# Patient Record
Sex: FEMALE | Race: WHITE | NOT HISPANIC OR LATINO | Employment: OTHER | URBAN - METROPOLITAN AREA
[De-identification: names, ages, dates, MRNs, and addresses within clinical notes are randomized per-mention and may not be internally consistent; named-entity substitution may affect disease eponyms.]

---

## 2017-11-20 ENCOUNTER — TELEPHONE (OUTPATIENT)
Dept: GASTROENTEROLOGY | Facility: CLINIC | Age: 77
End: 2017-11-20

## 2017-11-20 DIAGNOSIS — K83.8 DILATED BILE DUCT: Primary | ICD-10-CM

## 2017-11-21 NOTE — TELEPHONE ENCOUNTER
Spoke with patient's . She is seeing her Cardiologist on 12/1. Wants to wait until after that visit before deciding on when to schedule.

## 2017-12-06 ENCOUNTER — TELEPHONE (OUTPATIENT)
Dept: GASTROENTEROLOGY | Facility: CLINIC | Age: 77
End: 2017-12-06

## 2017-12-06 NOTE — TELEPHONE ENCOUNTER
Spoke with patient's daughter Dara Sandy [phone].   EUS/ERCP scheduled for 12/18 at   Reviewed prep instructions. Dara verbalized understanding.     Cardiology clearance for procedure is scanned under Media

## 2017-12-07 ENCOUNTER — HOSPITAL ENCOUNTER (INPATIENT)
Facility: HOSPITAL | Age: 77
LOS: 4 days | Discharge: HOME | DRG: 444 | End: 2017-12-11
Attending: INTERNAL MEDICINE | Admitting: INTERNAL MEDICINE
Payer: MEDICARE

## 2017-12-07 DIAGNOSIS — R74.01 TRANSAMINITIS: Primary | ICD-10-CM

## 2017-12-07 DIAGNOSIS — R50.9 FEVER, UNSPECIFIED FEVER CAUSE: ICD-10-CM

## 2017-12-07 DIAGNOSIS — R50.9 FEVER: ICD-10-CM

## 2017-12-07 DIAGNOSIS — R00.1 BRADYCARDIA: ICD-10-CM

## 2017-12-07 PROCEDURE — 20600001 HC STEP DOWN PRIVATE ROOM

## 2017-12-08 ENCOUNTER — ANESTHESIA EVENT (OUTPATIENT)
Dept: ENDOSCOPY | Facility: HOSPITAL | Age: 77
DRG: 444 | End: 2017-12-08
Payer: MEDICARE

## 2017-12-08 ENCOUNTER — ANESTHESIA (OUTPATIENT)
Dept: ENDOSCOPY | Facility: HOSPITAL | Age: 77
DRG: 444 | End: 2017-12-08
Payer: MEDICARE

## 2017-12-08 PROBLEM — R74.01 TRANSAMINITIS: Status: ACTIVE | Noted: 2017-12-08

## 2017-12-08 PROBLEM — F01.50 VASCULAR DEMENTIA: Status: ACTIVE | Noted: 2017-12-08

## 2017-12-08 PROBLEM — I10 HTN (HYPERTENSION): Status: ACTIVE | Noted: 2017-12-08

## 2017-12-08 PROBLEM — E11.9 DM (DIABETES MELLITUS): Status: ACTIVE | Noted: 2017-12-08

## 2017-12-08 LAB
ALBUMIN SERPL BCP-MCNC: 3.5 G/DL
ALP SERPL-CCNC: 153 U/L
ALT SERPL W/O P-5'-P-CCNC: 230 U/L
ANION GAP SERPL CALC-SCNC: 10 MMOL/L
AST SERPL-CCNC: 127 U/L
BASOPHILS # BLD AUTO: 0.04 K/UL
BASOPHILS NFR BLD: 0.5 %
BILIRUB DIRECT SERPL-MCNC: 0.4 MG/DL
BILIRUB SERPL-MCNC: 0.8 MG/DL
BILIRUB UR QL STRIP: NEGATIVE
BUN SERPL-MCNC: 11 MG/DL
CALCIUM SERPL-MCNC: 9.5 MG/DL
CHLORIDE SERPL-SCNC: 99 MMOL/L
CLARITY UR REFRACT.AUTO: CLEAR
CO2 SERPL-SCNC: 31 MMOL/L
COLOR UR AUTO: ABNORMAL
CREAT SERPL-MCNC: 0.9 MG/DL
DIFFERENTIAL METHOD: ABNORMAL
EOSINOPHIL # BLD AUTO: 0.1 K/UL
EOSINOPHIL NFR BLD: 1.1 %
ERYTHROCYTE [DISTWIDTH] IN BLOOD BY AUTOMATED COUNT: 13.7 %
EST. GFR  (AFRICAN AMERICAN): >60 ML/MIN/1.73 M^2
EST. GFR  (NON AFRICAN AMERICAN): >60 ML/MIN/1.73 M^2
ESTIMATED AVG GLUCOSE: 134 MG/DL
GLUCOSE SERPL-MCNC: 138 MG/DL
GLUCOSE UR QL STRIP: ABNORMAL
HAV IGM SERPL QL IA: NEGATIVE
HBA1C MFR BLD HPLC: 6.3 %
HBV CORE IGM SERPL QL IA: NEGATIVE
HBV SURFACE AG SERPL QL IA: NEGATIVE
HCT VFR BLD AUTO: 38.2 %
HCV AB SERPL QL IA: NEGATIVE
HGB BLD-MCNC: 12.6 G/DL
HGB UR QL STRIP: NEGATIVE
IMM GRANULOCYTES # BLD AUTO: 0.04 K/UL
IMM GRANULOCYTES NFR BLD AUTO: 0.5 %
INR PPP: 1.1
KETONES UR QL STRIP: ABNORMAL
LACTATE SERPL-SCNC: 0.8 MMOL/L
LACTATE SERPL-SCNC: 2.4 MMOL/L
LEUKOCYTE ESTERASE UR QL STRIP: NEGATIVE
LYMPHOCYTES # BLD AUTO: 0.7 K/UL
LYMPHOCYTES NFR BLD: 8.2 %
MAGNESIUM SERPL-MCNC: 2.2 MG/DL
MCH RBC QN AUTO: 30 PG
MCHC RBC AUTO-ENTMCNC: 33 G/DL
MCV RBC AUTO: 91 FL
MONOCYTES # BLD AUTO: 0.8 K/UL
MONOCYTES NFR BLD: 10.2 %
NEUTROPHILS # BLD AUTO: 6.6 K/UL
NEUTROPHILS NFR BLD: 79.5 %
NITRITE UR QL STRIP: NEGATIVE
NRBC BLD-RTO: 0 /100 WBC
PH UR STRIP: 8 [PH] (ref 5–8)
PHOSPHATE SERPL-MCNC: 2.6 MG/DL
PLATELET # BLD AUTO: 129 K/UL
PMV BLD AUTO: 11.4 FL
POCT GLUCOSE: 161 MG/DL (ref 70–110)
POCT GLUCOSE: 164 MG/DL (ref 70–110)
POCT GLUCOSE: 171 MG/DL (ref 70–110)
POCT GLUCOSE: 210 MG/DL (ref 70–110)
POCT GLUCOSE: 217 MG/DL (ref 70–110)
POTASSIUM SERPL-SCNC: 3.8 MMOL/L
PROCALCITONIN SERPL IA-MCNC: 0.39 NG/ML
PROT SERPL-MCNC: 8 G/DL
PROT UR QL STRIP: NEGATIVE
PROTHROMBIN TIME: 11.2 SEC
RBC # BLD AUTO: 4.2 M/UL
SODIUM SERPL-SCNC: 140 MMOL/L
SP GR UR STRIP: 1 (ref 1–1.03)
URN SPEC COLLECT METH UR: ABNORMAL
UROBILINOGEN UR STRIP-ACNC: NEGATIVE EU/DL
WBC # BLD AUTO: 8.25 K/UL

## 2017-12-08 PROCEDURE — C1769 GUIDE WIRE: HCPCS | Performed by: INTERNAL MEDICINE

## 2017-12-08 PROCEDURE — 83036 HEMOGLOBIN GLYCOSYLATED A1C: CPT

## 2017-12-08 PROCEDURE — 84145 PROCALCITONIN (PCT): CPT

## 2017-12-08 PROCEDURE — 94761 N-INVAS EAR/PLS OXIMETRY MLT: CPT

## 2017-12-08 PROCEDURE — 25000003 PHARM REV CODE 250: Performed by: NURSE ANESTHETIST, CERTIFIED REGISTERED

## 2017-12-08 PROCEDURE — 25000003 PHARM REV CODE 250: Performed by: STUDENT IN AN ORGANIZED HEALTH CARE EDUCATION/TRAINING PROGRAM

## 2017-12-08 PROCEDURE — 43274 ERCP DUCT STENT PLACEMENT: CPT | Performed by: INTERNAL MEDICINE

## 2017-12-08 PROCEDURE — 82962 GLUCOSE BLOOD TEST: CPT | Performed by: INTERNAL MEDICINE

## 2017-12-08 PROCEDURE — 36415 COLL VENOUS BLD VENIPUNCTURE: CPT

## 2017-12-08 PROCEDURE — 27202125 HC BALLOON, EXTRACTION (ANY): Performed by: INTERNAL MEDICINE

## 2017-12-08 PROCEDURE — 63600175 PHARM REV CODE 636 W HCPCS: Performed by: STUDENT IN AN ORGANIZED HEALTH CARE EDUCATION/TRAINING PROGRAM

## 2017-12-08 PROCEDURE — C2617 STENT, NON-COR, TEM W/O DEL: HCPCS | Performed by: INTERNAL MEDICINE

## 2017-12-08 PROCEDURE — 81003 URINALYSIS AUTO W/O SCOPE: CPT

## 2017-12-08 PROCEDURE — 74330 X-RAY BILE/PANC ENDOSCOPY: CPT | Mod: 26,,, | Performed by: INTERNAL MEDICINE

## 2017-12-08 PROCEDURE — 43274 ERCP DUCT STENT PLACEMENT: CPT | Mod: 59,,, | Performed by: INTERNAL MEDICINE

## 2017-12-08 PROCEDURE — 83605 ASSAY OF LACTIC ACID: CPT

## 2017-12-08 PROCEDURE — 87040 BLOOD CULTURE FOR BACTERIA: CPT

## 2017-12-08 PROCEDURE — 63600175 PHARM REV CODE 636 W HCPCS

## 2017-12-08 PROCEDURE — D9220A PRA ANESTHESIA: Mod: ANES,,, | Performed by: ANESTHESIOLOGY

## 2017-12-08 PROCEDURE — 43259 EGD US EXAM DUODENUM/JEJUNUM: CPT | Performed by: INTERNAL MEDICINE

## 2017-12-08 PROCEDURE — 85610 PROTHROMBIN TIME: CPT

## 2017-12-08 PROCEDURE — 43259 EGD US EXAM DUODENUM/JEJUNUM: CPT | Mod: 51,,, | Performed by: INTERNAL MEDICINE

## 2017-12-08 PROCEDURE — D9220A PRA ANESTHESIA: Mod: CRNA,,, | Performed by: NURSE ANESTHETIST, CERTIFIED REGISTERED

## 2017-12-08 PROCEDURE — 99223 1ST HOSP IP/OBS HIGH 75: CPT | Mod: 25,GC,, | Performed by: INTERNAL MEDICINE

## 2017-12-08 PROCEDURE — 80074 ACUTE HEPATITIS PANEL: CPT

## 2017-12-08 PROCEDURE — 99223 1ST HOSP IP/OBS HIGH 75: CPT | Mod: AI,GC,, | Performed by: INTERNAL MEDICINE

## 2017-12-08 PROCEDURE — 83735 ASSAY OF MAGNESIUM: CPT

## 2017-12-08 PROCEDURE — 37000009 HC ANESTHESIA EA ADD 15 MINS: Performed by: INTERNAL MEDICINE

## 2017-12-08 PROCEDURE — 83605 ASSAY OF LACTIC ACID: CPT | Mod: 91

## 2017-12-08 PROCEDURE — 63600175 PHARM REV CODE 636 W HCPCS: Performed by: NURSE ANESTHETIST, CERTIFIED REGISTERED

## 2017-12-08 PROCEDURE — 99499 UNLISTED E&M SERVICE: CPT | Mod: ,,, | Performed by: PHYSICIAN ASSISTANT

## 2017-12-08 PROCEDURE — 85025 COMPLETE CBC W/AUTO DIFF WBC: CPT

## 2017-12-08 PROCEDURE — 27202127 HC STENT INTRODUCER: Performed by: INTERNAL MEDICINE

## 2017-12-08 PROCEDURE — 80048 BASIC METABOLIC PNL TOTAL CA: CPT

## 2017-12-08 PROCEDURE — 80076 HEPATIC FUNCTION PANEL: CPT

## 2017-12-08 PROCEDURE — 0F798DZ DILATION OF COMMON BILE DUCT WITH INTRALUMINAL DEVICE, VIA NATURAL OR ARTIFICIAL OPENING ENDOSCOPIC: ICD-10-PCS | Performed by: INTERNAL MEDICINE

## 2017-12-08 PROCEDURE — 0DJ08ZZ INSPECTION OF UPPER INTESTINAL TRACT, VIA NATURAL OR ARTIFICIAL OPENING ENDOSCOPIC: ICD-10-PCS | Performed by: INTERNAL MEDICINE

## 2017-12-08 PROCEDURE — 20600001 HC STEP DOWN PRIVATE ROOM

## 2017-12-08 PROCEDURE — 27000221 HC OXYGEN, UP TO 24 HOURS

## 2017-12-08 PROCEDURE — 0F7D8DZ DILATION OF PANCREATIC DUCT WITH INTRALUMINAL DEVICE, VIA NATURAL OR ARTIFICIAL OPENING ENDOSCOPIC: ICD-10-PCS | Performed by: INTERNAL MEDICINE

## 2017-12-08 PROCEDURE — 27201674 HC SPHINCTERTOME: Performed by: INTERNAL MEDICINE

## 2017-12-08 PROCEDURE — 74330 X-RAY BILE/PANC ENDOSCOPY: CPT | Performed by: INTERNAL MEDICINE

## 2017-12-08 PROCEDURE — 37000008 HC ANESTHESIA 1ST 15 MINUTES: Performed by: INTERNAL MEDICINE

## 2017-12-08 PROCEDURE — 25000003 PHARM REV CODE 250: Performed by: INTERNAL MEDICINE

## 2017-12-08 PROCEDURE — 84100 ASSAY OF PHOSPHORUS: CPT

## 2017-12-08 DEVICE — IMPLANTABLE DEVICE
Type: IMPLANTABLE DEVICE | Site: PANCREAS | Status: NON-FUNCTIONAL
Removed: 2018-01-04

## 2017-12-08 RX ORDER — ENOXAPARIN SODIUM 100 MG/ML
40 INJECTION SUBCUTANEOUS EVERY 24 HOURS
Status: DISCONTINUED | OUTPATIENT
Start: 2017-12-08 | End: 2017-12-08

## 2017-12-08 RX ORDER — IBUPROFEN 200 MG
24 TABLET ORAL
Status: DISCONTINUED | OUTPATIENT
Start: 2017-12-08 | End: 2017-12-11 | Stop reason: HOSPADM

## 2017-12-08 RX ORDER — PHENYLEPHRINE HYDROCHLORIDE 10 MG/ML
INJECTION INTRAVENOUS
Status: DISCONTINUED | OUTPATIENT
Start: 2017-12-08 | End: 2017-12-08

## 2017-12-08 RX ORDER — METRONIDAZOLE 500 MG/1
500 TABLET ORAL EVERY 8 HOURS
Status: DISCONTINUED | OUTPATIENT
Start: 2017-12-08 | End: 2017-12-11 | Stop reason: HOSPADM

## 2017-12-08 RX ORDER — ONDANSETRON 2 MG/ML
4 INJECTION INTRAMUSCULAR; INTRAVENOUS DAILY PRN
Status: DISCONTINUED | OUTPATIENT
Start: 2017-12-08 | End: 2017-12-08 | Stop reason: HOSPADM

## 2017-12-08 RX ORDER — PILOCARPINE HYDROCHLORIDE 5 MG/1
5 TABLET, FILM COATED ORAL NIGHTLY
Status: DISCONTINUED | OUTPATIENT
Start: 2017-12-08 | End: 2017-12-11 | Stop reason: HOSPADM

## 2017-12-08 RX ORDER — ONDANSETRON 2 MG/ML
4 INJECTION INTRAMUSCULAR; INTRAVENOUS EVERY 6 HOURS PRN
Status: DISCONTINUED | OUTPATIENT
Start: 2017-12-08 | End: 2017-12-11 | Stop reason: HOSPADM

## 2017-12-08 RX ORDER — SOLIFENACIN SUCCINATE 10 MG/1
TABLET, FILM COATED ORAL DAILY
Status: ON HOLD | COMMUNITY
End: 2020-08-18

## 2017-12-08 RX ORDER — DONEPEZIL HYDROCHLORIDE 5 MG/1
10 TABLET, FILM COATED ORAL NIGHTLY
Status: DISCONTINUED | OUTPATIENT
Start: 2017-12-08 | End: 2017-12-11 | Stop reason: HOSPADM

## 2017-12-08 RX ORDER — SUCCINYLCHOLINE CHLORIDE 20 MG/ML
INJECTION INTRAMUSCULAR; INTRAVENOUS
Status: DISCONTINUED | OUTPATIENT
Start: 2017-12-08 | End: 2017-12-08

## 2017-12-08 RX ORDER — SODIUM CHLORIDE 0.9 % (FLUSH) 0.9 %
3 SYRINGE (ML) INJECTION
Status: DISCONTINUED | OUTPATIENT
Start: 2017-12-08 | End: 2017-12-08 | Stop reason: HOSPADM

## 2017-12-08 RX ORDER — ENOXAPARIN SODIUM 100 MG/ML
40 INJECTION SUBCUTANEOUS EVERY 24 HOURS
Status: DISCONTINUED | OUTPATIENT
Start: 2017-12-09 | End: 2017-12-11 | Stop reason: HOSPADM

## 2017-12-08 RX ORDER — SODIUM CHLORIDE 9 MG/ML
INJECTION, SOLUTION INTRAVENOUS CONTINUOUS
Status: ACTIVE | OUTPATIENT
Start: 2017-12-08 | End: 2017-12-08

## 2017-12-08 RX ORDER — BISOPROLOL FUMARATE 5 MG/1
5 TABLET, FILM COATED ORAL DAILY
Status: DISCONTINUED | OUTPATIENT
Start: 2017-12-08 | End: 2017-12-11 | Stop reason: HOSPADM

## 2017-12-08 RX ORDER — URSODIOL 500 MG/1
750 TABLET, FILM COATED ORAL NIGHTLY
COMMUNITY

## 2017-12-08 RX ORDER — AMITRIPTYLINE HYDROCHLORIDE 10 MG/1
10 TABLET, FILM COATED ORAL NIGHTLY
Status: DISCONTINUED | OUTPATIENT
Start: 2017-12-08 | End: 2017-12-11 | Stop reason: HOSPADM

## 2017-12-08 RX ORDER — IRBESARTAN 150 MG/1
150 TABLET ORAL DAILY
COMMUNITY

## 2017-12-08 RX ORDER — OXYMETAZOLINE HCL 0.05 %
2 SPRAY, NON-AEROSOL (ML) NASAL 2 TIMES DAILY PRN
Status: DISCONTINUED | OUTPATIENT
Start: 2017-12-08 | End: 2017-12-11

## 2017-12-08 RX ORDER — HYDROXYCHLOROQUINE SULFATE 200 MG/1
200 TABLET, FILM COATED ORAL EVERY MORNING
COMMUNITY

## 2017-12-08 RX ORDER — LIDOCAINE HCL/PF 100 MG/5ML
SYRINGE (ML) INTRAVENOUS
Status: DISCONTINUED | OUTPATIENT
Start: 2017-12-08 | End: 2017-12-08

## 2017-12-08 RX ORDER — URSODIOL 250 MG/1
750 TABLET, FILM COATED ORAL NIGHTLY
Status: DISCONTINUED | OUTPATIENT
Start: 2017-12-08 | End: 2017-12-11 | Stop reason: HOSPADM

## 2017-12-08 RX ORDER — SODIUM CHLORIDE 9 MG/ML
INJECTION, SOLUTION INTRAVENOUS CONTINUOUS PRN
Status: DISCONTINUED | OUTPATIENT
Start: 2017-12-08 | End: 2017-12-08

## 2017-12-08 RX ORDER — DONEPEZIL HYDROCHLORIDE 10 MG/1
10 TABLET, FILM COATED ORAL NIGHTLY
COMMUNITY

## 2017-12-08 RX ORDER — URSODIOL 250 MG/1
750 TABLET, FILM COATED ORAL EVERY MORNING
Status: DISCONTINUED | OUTPATIENT
Start: 2017-12-08 | End: 2017-12-11 | Stop reason: HOSPADM

## 2017-12-08 RX ORDER — IRBESARTAN 300 MG/1
300 TABLET ORAL NIGHTLY
Status: DISCONTINUED | OUTPATIENT
Start: 2017-12-08 | End: 2017-12-11 | Stop reason: HOSPADM

## 2017-12-08 RX ORDER — GLIMEPIRIDE 1 MG/1
1 TABLET ORAL NIGHTLY
Status: ON HOLD | COMMUNITY
End: 2020-08-18

## 2017-12-08 RX ORDER — AMITRIPTYLINE HYDROCHLORIDE 10 MG/1
10 TABLET, FILM COATED ORAL NIGHTLY
COMMUNITY

## 2017-12-08 RX ORDER — ONDANSETRON 2 MG/ML
INJECTION INTRAMUSCULAR; INTRAVENOUS
Status: DISCONTINUED | OUTPATIENT
Start: 2017-12-08 | End: 2017-12-08

## 2017-12-08 RX ORDER — HYDROXYCHLOROQUINE SULFATE 200 MG/1
200 TABLET, FILM COATED ORAL 2 TIMES DAILY
Status: DISCONTINUED | OUTPATIENT
Start: 2017-12-08 | End: 2017-12-11 | Stop reason: HOSPADM

## 2017-12-08 RX ORDER — INDOMETHACIN 50 MG/1
SUPPOSITORY RECTAL
Status: COMPLETED | OUTPATIENT
Start: 2017-12-08 | End: 2017-12-08

## 2017-12-08 RX ORDER — ACETAMINOPHEN 10 MG/ML
1000 INJECTION, SOLUTION INTRAVENOUS ONCE
Status: COMPLETED | OUTPATIENT
Start: 2017-12-08 | End: 2017-12-08

## 2017-12-08 RX ORDER — ACETAMINOPHEN 10 MG/ML
INJECTION, SOLUTION INTRAVENOUS
Status: COMPLETED
Start: 2017-12-08 | End: 2017-12-08

## 2017-12-08 RX ORDER — AMLODIPINE BESYLATE 10 MG/1
10 TABLET ORAL NIGHTLY
Status: DISCONTINUED | OUTPATIENT
Start: 2017-12-08 | End: 2017-12-11 | Stop reason: HOSPADM

## 2017-12-08 RX ORDER — ALPRAZOLAM 0.25 MG/1
0.12 TABLET ORAL EVERY 12 HOURS
COMMUNITY

## 2017-12-08 RX ORDER — MEROPENEM AND SODIUM CHLORIDE 1 G/50ML
1 INJECTION, SOLUTION INTRAVENOUS ONCE
Status: COMPLETED | OUTPATIENT
Start: 2017-12-08 | End: 2017-12-08

## 2017-12-08 RX ORDER — FENTANYL CITRATE 50 UG/ML
25 INJECTION, SOLUTION INTRAMUSCULAR; INTRAVENOUS EVERY 5 MIN PRN
Status: DISCONTINUED | OUTPATIENT
Start: 2017-12-08 | End: 2017-12-08 | Stop reason: HOSPADM

## 2017-12-08 RX ORDER — ASPIRIN 81 MG/1
81 TABLET ORAL DAILY
Status: DISCONTINUED | OUTPATIENT
Start: 2017-12-08 | End: 2017-12-11 | Stop reason: HOSPADM

## 2017-12-08 RX ORDER — FLUOXETINE HYDROCHLORIDE 40 MG/1
40 CAPSULE ORAL EVERY MORNING
COMMUNITY

## 2017-12-08 RX ORDER — CIPROFLOXACIN 500 MG/1
500 TABLET ORAL EVERY 12 HOURS
Status: DISCONTINUED | OUTPATIENT
Start: 2017-12-08 | End: 2017-12-11 | Stop reason: HOSPADM

## 2017-12-08 RX ORDER — PILOCARPINE HYDROCHLORIDE 5 MG/1
5 TABLET, FILM COATED ORAL NIGHTLY
COMMUNITY

## 2017-12-08 RX ORDER — GLUCAGON 1 MG
1 KIT INJECTION
Status: DISCONTINUED | OUTPATIENT
Start: 2017-12-08 | End: 2017-12-11 | Stop reason: HOSPADM

## 2017-12-08 RX ORDER — IBUPROFEN 200 MG
16 TABLET ORAL
Status: DISCONTINUED | OUTPATIENT
Start: 2017-12-08 | End: 2017-12-11 | Stop reason: HOSPADM

## 2017-12-08 RX ORDER — INSULIN ASPART 100 [IU]/ML
0-5 INJECTION, SOLUTION INTRAVENOUS; SUBCUTANEOUS
Status: DISCONTINUED | OUTPATIENT
Start: 2017-12-08 | End: 2017-12-11 | Stop reason: HOSPADM

## 2017-12-08 RX ORDER — BISOPROLOL FUMARATE 5 MG/1
5 TABLET, FILM COATED ORAL DAILY
Status: ON HOLD | COMMUNITY
End: 2020-08-18

## 2017-12-08 RX ORDER — TORSEMIDE 10 MG/1
10 TABLET ORAL DAILY
Status: DISCONTINUED | OUTPATIENT
Start: 2017-12-08 | End: 2017-12-08

## 2017-12-08 RX ORDER — PROPOFOL 10 MG/ML
VIAL (ML) INTRAVENOUS
Status: DISCONTINUED | OUTPATIENT
Start: 2017-12-08 | End: 2017-12-08

## 2017-12-08 RX ORDER — FLUOXETINE HYDROCHLORIDE 20 MG/1
20 CAPSULE ORAL DAILY
Status: DISCONTINUED | OUTPATIENT
Start: 2017-12-08 | End: 2017-12-11 | Stop reason: HOSPADM

## 2017-12-08 RX ORDER — SOLIFENACIN SUCCINATE 10 MG/1
10 TABLET, FILM COATED ORAL DAILY
Status: DISCONTINUED | OUTPATIENT
Start: 2017-12-08 | End: 2017-12-11 | Stop reason: HOSPADM

## 2017-12-08 RX ORDER — ASPIRIN 81 MG/1
81 TABLET ORAL DAILY
Status: ON HOLD | COMMUNITY
End: 2017-12-11

## 2017-12-08 RX ORDER — PILOCARPINE HYDROCHLORIDE 5 MG/1
10 TABLET, FILM COATED ORAL EVERY MORNING
Status: DISCONTINUED | OUTPATIENT
Start: 2017-12-08 | End: 2017-12-11 | Stop reason: HOSPADM

## 2017-12-08 RX ORDER — PILOCARPINE HYDROCHLORIDE 5 MG/1
10 TABLET, FILM COATED ORAL EVERY MORNING
COMMUNITY

## 2017-12-08 RX ADMIN — EPHEDRINE SULFATE 10 MG: 50 INJECTION, SOLUTION INTRAMUSCULAR; INTRAVENOUS; SUBCUTANEOUS at 12:12

## 2017-12-08 RX ADMIN — LIDOCAINE HYDROCHLORIDE 80 MG: 20 INJECTION, SOLUTION INTRAVENOUS at 12:12

## 2017-12-08 RX ADMIN — ONDANSETRON 4 MG: 2 INJECTION INTRAMUSCULAR; INTRAVENOUS at 03:12

## 2017-12-08 RX ADMIN — METRONIDAZOLE 500 MG: 500 TABLET ORAL at 11:12

## 2017-12-08 RX ADMIN — Medication 10 MG: at 04:12

## 2017-12-08 RX ADMIN — BISOPROLOL FUMARATE 5 MG: 5 TABLET, COATED ORAL at 03:12

## 2017-12-08 RX ADMIN — PILOCARPINE HYDROCHLORIDE 10 MG: 5 TABLET, FILM COATED ORAL at 07:12

## 2017-12-08 RX ADMIN — SUCCINYLCHOLINE CHLORIDE 140 MG: 20 INJECTION, SOLUTION INTRAMUSCULAR; INTRAVENOUS at 12:12

## 2017-12-08 RX ADMIN — PHENYLEPHRINE HYDROCHLORIDE 100 MCG: 10 INJECTION INTRAVENOUS at 12:12

## 2017-12-08 RX ADMIN — SODIUM CHLORIDE: 0.9 INJECTION, SOLUTION INTRAVENOUS at 04:12

## 2017-12-08 RX ADMIN — ONDANSETRON 4 MG: 2 INJECTION INTRAMUSCULAR; INTRAVENOUS at 10:12

## 2017-12-08 RX ADMIN — MEROPENEM AND SODIUM CHLORIDE 1 G: 1 INJECTION, SOLUTION INTRAVENOUS at 01:12

## 2017-12-08 RX ADMIN — DONEPEZIL HYDROCHLORIDE 10 MG: 5 TABLET, FILM COATED ORAL at 01:12

## 2017-12-08 RX ADMIN — ACETAMINOPHEN 1000 MG: 10 INJECTION, SOLUTION INTRAVENOUS at 02:12

## 2017-12-08 RX ADMIN — PROPOFOL 40 MG: 10 INJECTION, EMULSION INTRAVENOUS at 12:12

## 2017-12-08 RX ADMIN — URSODIOL 750 MG: 250 TABLET, FILM COATED ORAL at 11:12

## 2017-12-08 RX ADMIN — AMITRIPTYLINE HYDROCHLORIDE 10 MG: 10 TABLET, FILM COATED ORAL at 02:12

## 2017-12-08 RX ADMIN — IRBESARTAN 300 MG: 300 TABLET ORAL at 11:12

## 2017-12-08 RX ADMIN — HYDROXYCHLOROQUINE SULFATE 200 MG: 200 TABLET, FILM COATED ORAL at 11:12

## 2017-12-08 RX ADMIN — INDOMETHACIN 100 MG: 50 SUPPOSITORY RECTAL at 01:12

## 2017-12-08 RX ADMIN — METRONIDAZOLE 500 MG: 500 TABLET ORAL at 04:12

## 2017-12-08 RX ADMIN — PILOCARPINE HYDROCHLORIDE 5 MG: 5 TABLET, FILM COATED ORAL at 11:12

## 2017-12-08 RX ADMIN — INSULIN ASPART 2 UNITS: 100 INJECTION, SOLUTION INTRAVENOUS; SUBCUTANEOUS at 05:12

## 2017-12-08 RX ADMIN — FLUOXETINE 20 MG: 20 CAPSULE ORAL at 04:12

## 2017-12-08 RX ADMIN — CIPROFLOXACIN HYDROCHLORIDE 500 MG: 500 TABLET, FILM COATED ORAL at 04:12

## 2017-12-08 RX ADMIN — DONEPEZIL HYDROCHLORIDE 10 MG: 5 TABLET, FILM COATED ORAL at 11:12

## 2017-12-08 RX ADMIN — PROPOFOL 150 MG: 10 INJECTION, EMULSION INTRAVENOUS at 12:12

## 2017-12-08 RX ADMIN — AMLODIPINE BESYLATE 10 MG: 10 TABLET ORAL at 02:12

## 2017-12-08 RX ADMIN — ONDANSETRON 4 MG: 2 INJECTION INTRAMUSCULAR; INTRAVENOUS at 12:12

## 2017-12-08 RX ADMIN — AMITRIPTYLINE HYDROCHLORIDE 10 MG: 10 TABLET, FILM COATED ORAL at 11:12

## 2017-12-08 RX ADMIN — ENOXAPARIN SODIUM 40 MG: 100 INJECTION SUBCUTANEOUS at 04:12

## 2017-12-08 RX ADMIN — VANCOMYCIN HYDROCHLORIDE 1000 MG: 1 INJECTION, POWDER, LYOPHILIZED, FOR SOLUTION INTRAVENOUS at 07:12

## 2017-12-08 RX ADMIN — OXYMETAZOLINE HYDROCHLORIDE 2 SPRAY: 5 SPRAY NASAL at 02:12

## 2017-12-08 RX ADMIN — SODIUM CHLORIDE: 0.9 INJECTION, SOLUTION INTRAVENOUS at 12:12

## 2017-12-08 RX ADMIN — URSODIOL 750 MG: 250 TABLET, FILM COATED ORAL at 01:12
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
## 2017-12-08 NOTE — PLAN OF CARE
"Vss. sats 99% on 1L nc.  Pt states "she wears up to 4L nc oxygen at home."  Pt does not want to wean oxygen. 10th floor rn notified as well.  When pt arrived to [facility],  Right upper gum and lip irritated from broken pointy tooth to bottom.  Mouth care done. Per pt request, guaze placed over tooth to relieve pressure.  Pt's daughter has pt's upper dentures. Not in place.  pacu resident notified and pt given iv tylenol per md order.  At this time, pt denies pain.  See flowsheet for full assessment.   "

## 2017-12-08 NOTE — CONSULTS
[facility]  Infectious Disease  Consult Note    Patient Name: Justa Acosta  MRN: [id number]  Admission Date: 12/7/2017  Hospital Length of Stay: 1 days  Attending Physician: Celine Tsang MD  Primary Care Provider: Yordy Collier MD       Inpatient consult to Infectious Diseases  Consult performed by: CHINA YUN  Consult ordered by: COURTNEY LUONG consult received for recurrent sepsis and concern for biliary source. Chart being reviewed.     Continue broad spectrum antibiotics for now. Full consult note with recommendations to follow.      Thank you,  China Yun PA-C

## 2017-12-08 NOTE — ASSESSMENT & PLAN NOTE
"76 yo F PMhx vascular dementia, CAD, PAD s/p aortic and b/l femoral stenting, KELI, Sjrogren's, COPD on 3-4L home O2, HTN, DM and viral hepatitis (per patient s/p blood transfusion years ago but unsure which virus), PShx cholecystectomy who presented s/p transfer with fever, nausea, disorientation, and "whole body shaking." Per daughter home health nurse noted patient's saturation 70s on home 3L which corrected when increased to 5L, however patient also had the above symptoms therefore daughter brought patient to ED.  Her breathing is actually back to baseline currently per patient, back to 99% on 3 liters nasal canula.     Previous episodes of elevated LFTs, patient and daughter deny ever noting jaundice, no abdominal pain currently.     Patient was seen by Dr. Shavon Barrios with cardiology for risk stratification prior to endoscopic procedures, and the patient was felt to be low to moderate risk for the procedure.    Also of note, patient had EUS in 2012, There was no sign of significant pathology in the entire pancreas. There was dilation in the common bile duct which measured up to 10 mm. Endosonographic imaging of the visualized portion of the biliary system showed no sludge, no mass, no stricture and no stones. 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  Also had labs ordered by Dr. Avery.    Patient received lovenox at 4am.    Recommendations:  - keep patient NPO  - hold heparin products   - will plan for EUS +/- ERCP later today   - broad spectrum antibiotics and blood cultures   "

## 2017-12-08 NOTE — PLAN OF CARE
Please call extension [phone] (if nobody answers, this will flip to a beeper, so put in your call back number) upon patient arrival to floor for Hospital Medicine admit team assignment and for additional admit orders for the patient.      Outside Transfer Acceptance Note     Patients name: Justa Acosta  mrn: [id number]    Transferring Physician or Mid-Level provider/Clinic giving report:   Dr. Santamaria ED physician at [facility]     Accepting Physician for admission to hospital: River Moe M.D.      Date of acceptance:  11/7/17     Reason for transfer:  Biliary sepsis, needs AES    Report from Physician/Mid-Level Provider:  HPI: Ms. Acosta is a 78yo lady with a history of vascular dementia, CAD, PAD, leg stents, KELI, chronic resp failure on home O2 at 3L nc, HTN, and HLD.  She apparently has a long history of an unknown biliary disease.  She has had a cholecystectomy in the remote past.  Despite this, over the past year she has been admitted at [facility] multiple times with acute elevations of her LFTs to the 400s along with GNR sepsis.  ID there eventually came to the conclusion that this was likely biliary in nature, as multiple CT scans (11/17) and US (4/17) showed vaguely abnormal CBD and IH biliary system.  She is unable to undergo MRCP due to having a baclofen pain pump.    She was recently admitted early November for the above and grew out K. pneumo out of her blood (S: cipro, ertapenem, Imipinem, Gent, Zosyn and augmentin) and (R: Cefepime, Rocephin, Ancef, Amp).  She was discharged on a po course of Augmentin for this, which she  just finished.  It was recommended by GI there that she come to our AES department for ERCP and EUS.  Dr. Sutherland was contacted and endoscopy was scheduled.  As per telephone notes by Dr. Sutherland, Spoke with patient's daughter Dara Sandy [phone].   EUS/ERCP scheduled for 12/18 at .    Unfortunately, today she became slightly confused and lethargic,  which is apparently how she behaves during the development of her episodes of biliary sepsis.  The family brought her to the ED for this.  She is having no RUQ pain, though she did begin to spike a fever in the ED.  She was given one dose of Meropenem in the ED there.  I asked that he also give Cipro 400mg iv x 1 as well.  The case was also discussed with Dr. Hsu from the transfer center, who agreed to assist in the care of the patient.  The sending physician felt that she would be stable for the floor on telemetry.    VS: 100.7F, /63, p 67, RR 23, 97% on 4l nc     LABS: , , , LA 1.6, WBC 10.4, CR 0.8  URINALYSIS NORMAL    RADIOGRAPHS: CXR- no acute abnormalities      To Do List upon arrival:  Start iv Zosyn and consult AES.  NPO for ERCP.  Blood cultures.    Please call extension [phone] (if nobody answers, this will flip to a beeper, so put in your call back number) upon patient arrival to floor for Hospital Medicine admit team assignment and for additional admit orders for the patient.
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
## 2017-12-08 NOTE — ASSESSMENT & PLAN NOTE
Patient with multiple admission to [facility] for sepsis, bacteremia on last admission in Oct-Nov w/ Klebsiella  Currently no leukocytosis  S/p meropenem x 1, cipro x 1 at OSH  Giving meropenem x 1  Concern for biliary source  Started on cipro and flagyl for gram negative and anearobic coverage  ID consult  NPO for now  Primary team to contact [facility] in AM for EUS/ERCP  Patient only sent with minimal records  Medical records at [facility] closed, primary team to contact in AM for records

## 2017-12-08 NOTE — ANESTHESIA RELEASE NOTE
"Anesthesia Release from PACU Note    Patient: Justa PLEITEZ Karla    Procedure(s) Performed: Procedure(s) (LRB):  ERCP (N/A)  ULTRASOUND-ENDOSCOPIC-UPPER (N/A)    Anesthesia type: general    Post pain: Adequate analgesia    Post assessment: no apparent anesthetic complications    Last Vitals:   Visit Vitals  BP (!) 160/69   Pulse 67   Temp 36.6 °C (97.9 °F) (Oral)   Resp 16   Ht 5' 2" (1.575 m)   Wt 82.6 kg (182 lb)   SpO2 100%   BMI 33.29 kg/m²       Post vital signs: stable    Level of consciousness: awake    Nausea/Vomiting: no nausea/no vomiting    Complications: none    Airway Patency: patent    Respiratory: unassisted    Cardiovascular: stable and hypertensive secondary to not taking home bp medications. Resumed in PACU.     Hydration: euvolemic  "

## 2017-12-08 NOTE — TRANSFER OF CARE
"Anesthesia Transfer of Care Note    Patient: Justa Acosta    Procedure(s) Performed: Procedure(s) (LRB):  ERCP (N/A)  ULTRASOUND-ENDOSCOPIC-UPPER (N/A)    Patient location: PACU    Anesthesia Type: general    Transport from OR: Transported from OR on 6-10 L/min O2 by face mask with adequate spontaneous ventilation    Post pain: adequate analgesia    Post assessment: tolerated procedure well and no apparent anesthetic complications    Post vital signs: stable    Level of consciousness: awake, alert and oriented    Nausea/Vomiting: no nausea/vomiting    Complications: none    Transfer of care protocol was followed      Last vitals:   Visit Vitals  BP (!) 188/74 (BP Location: Right arm, Patient Position: Lying)   Pulse 77   Temp 36.6 °C (97.8 °F) (Axillary)   Resp 17   Ht 5' 2" (1.575 m)   Wt 82.6 kg (182 lb)   SpO2 99%   BMI 33.29 kg/m²     "

## 2017-12-08 NOTE — NURSING
Pt c/o nausea/vomit. Given zofran iv prior to going to endo for ERCP. Pt to endo via stretcher. Pt accompanied by transport associate and daughter.

## 2017-12-08 NOTE — PATIENT INSTRUCTIONS
Discharge Summary/Instructions after an Endoscopic Procedure  Patient Name: Justa Acosta  Patient MRN: [id number]  Patient YOB: 1940 Friday, December 08, 2017  Tray Hsu MD  RESTRICTIONS:  During your procedure today, you received medications for sedation.  These   medications may affect your judgment, balance and coordination.  Therefore,   for 24 hours, you have the following restrictions:   - DO NOT drive a car, operate machinery, make legal/financial decisions,   sign important papers or drink alcohol.    ACTIVITY:  The following day: return to full activity including work, except no heavy   lifting, straining or running for 3 days if polyps were removed.  DIET:  Eat and drink normally unless instructed otherwise.     TREATMENT FOR COMMON SIDE EFFECTS:  - Mild abdominal pain, belching, bloating or excessive gas: rest, eat   lightly and use a heating pad.  - Sore Throat: treat with throat lozenges and/or gargle with warm salt   water.  SYMPTOMS TO WATCH FOR AND REPORT TO YOUR PHYSICIAN:  1. Abdominal pain or bloating, other than gas cramps.  2. Chest pain.  3. Back pain.  4. Chills or fever occurring within 24 hours after the procedure.  5. Rectal bleeding, which would show as bright red, maroon, or black stools.   (A tablespoon of blood from the rectum is not serious, especially if   hemorrhoids are present.)  6. Vomiting.  7. Weakness or dizziness.  8. Because air was used during the procedure, expelling large amounts of air   from your rectum or belching is normal.  9. If a bowel prep was taken, you may not have a bowel movement for 1-3   days.  This is normal.  GO DIRECTLY TO THE NEAREST EMERGENCY ROOM IF YOU HAVE ANY OF THE FOLLOWING:      Difficulty breathing  Chills and/or fever over 101 F   Persistent vomiting and/or vomiting blood   Severe abdominal pain   Severe chest pain   Black, tarry stools   Bleeding- more than one tablespoon   Any other symptom or condition that you may feel  needs urgent attention  Your doctor recommends these additional instructions:  If any biopsies were taken, your doctor s clinic will contact you in 1 to 2   weeks with any results.  Your physician has recommended an ERCP today.  For questions, problems or results please call your physician - Tray Hsu MD at Work:  [phone].  [facility], EMERGENCY ROOM PHONE NUMBER: [phone]  IF A COMPLICATION OR EMERGENCY SITUATION ARISES AND YOU ARE UNABLE TO REACH   YOUR PHYSICIAN - GO DIRECTLY TO THE EMERGENCY ROOM.  Tray Hsu MD  12/8/2017 1:31:07 PM  This report has been verified and signed electronically.

## 2017-12-08 NOTE — HPI
"78 yo F PMhx vascular dementia, CAD, PAD s/p aortic and b/l femoral stenting, KELI, Sjrogren's, COPD on 3-4L home O2, HTN, DM and viral hepatitis (per patient s/p blood transfusion years ago but unsure which virus), PShx cholecystectomy who presented s/p transfer with fever, nausea, disorientation, and "whole body shaking." Per daughter home health nurse noted patient's saturation 70s on home 3L which corrected when increased to 5L, however patient also had the above symptoms therefore daughter brought patient to ED.  Her breathing is actually back to baseline currently per patient, back to 99% on 3 liters nasal canula.     Patient had initially been seen by her GI physician, Dr. Vines for some time for elevated liver enzymes. She was admitted to [facility] once in April and again on Oct 31st where she presented with fever, diorientation, nausea, and further elevation of liver enzymes as high as 400s. Patient was seen by ID who was concerned for biliary source given imaging which had shown CBD dilation. During her most recent admission in late October-November patient was bacteremic with K. pneumo. She was discharged home on PO antibiotics just like in April, patient and daughter unsure of which antibiotic. Patient was scheduled for oupatient EUS/ERCP at [facility] on 12/18 for further evaluation. Per daughter each time patient presents with similar symptoms as outlined above she is found to be septic. In OSH ED patient was febrile s/p meropenem x 1, cirpo IV x 1, as well tylenol and her fever subsequently Defervesced.     Patient was seen by Dr. Shavon Barrios with cardiology for risk stratification prior to endoscopic procedures, and the patient was felt to be low to moderate risk for the procedure.    Also of note, patient had EUS in 2012, There was no sign of significant pathology in the entire pancreas. There was dilation in the common bile duct which measured up to 10 mm. Endosonographic imaging " of the visualized portion of the biliary system showed no sludge, no mass, no stricture and no stones. 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  Also had labs ordered by Dr. Avery.    Patient has never really had any abdominal pain.

## 2017-12-08 NOTE — ASSESSMENT & PLAN NOTE
Improved from OSH records prior to admission  , ; ratio   Per patient and daughter pt w/ viral hepatitis (unsure of which virus), state this was contracted from blood transfusion she received years ago, states doctor at that time passed away and pt does not have records  F/u hepatitis panel

## 2017-12-08 NOTE — NURSING
MD notified pt did not receive 1x dose vancomycin this am. When pt arrived to ENDO RN noticed that all fluid had been admin from bag bu powder medication still in vile.

## 2017-12-08 NOTE — HPI
"78 yo F PMhx vascular dementia, CAD, PAD s/p aortic and b/l femoral stenting, KELI, Sjrogren's, COPD on 3L home O2, HTN, DM and viral hepatitis (per patient s/p blood transfusion years ago but unsure which virus), PShx cholecystectomy who presented s/p transfer with fever, nausea, disorientation, and "whole body shaking." Per daughter home health nurse noted patient's saturation 70s on home 3L which corrected when increased to 5L, however patient also had the above symptoms therefore daughter brought patient to ED.    Patient had initially been seen by her GI physician for some time for elevated liver enzymes. She was admitted to [facility] once in April and again on Oct 31st where she presented with fever, diorientation, nausea, and further elevation of liver enzymes as high as 400s. Patient was seen by ID who was concerned for biliary source given imaging which had shown CBD dilation. During her most recent admission in late October-November patient was bacteremic with K. pneumo. Per transfer note sensitivies (S: cipro, ertapenem, Imipinem, Gent, Zosyn and augmentin) and (R: Cefepime, Rocephin, Ancef, Amp). She was discharged home on PO antibiotics just like in April, patient and daughter unsure of which antibiotic. Patient was scheduled for oupatient EUS/ERCP at [facility] on 12/18 for further evaluation. Per daughter each time patient presents with similar symptoms as outlined above she is found to be septic. In OSH ED patient was febrile s/p meropenem x 1, cirpo IV x 1, as well tylenol and her fever subsequently Defervesced.    Per transfer note the case was discussed with Dr. Hsu from the transfer center, who agreed to assist in the care of the patient.  "

## 2017-12-08 NOTE — PATIENT INSTRUCTIONS
Discharge Summary/Instructions after an Endoscopic Procedure  Patient Name: Justa Acosta  Patient MRN: [id number]  Patient YOB: 1940 Friday, December 08, 2017  Tray Hsu MD  RESTRICTIONS:  During your procedure today, you received medications for sedation.  These   medications may affect your judgment, balance and coordination.  Therefore,   for 24 hours, you have the following restrictions:   - DO NOT drive a car, operate machinery, make legal/financial decisions,   sign important papers or drink alcohol.    ACTIVITY:  The following day: return to full activity including work, except no heavy   lifting, straining or running for 3 days if polyps were removed.  DIET:  Eat and drink normally unless instructed otherwise.     TREATMENT FOR COMMON SIDE EFFECTS:  - Mild abdominal pain, belching, bloating or excessive gas: rest, eat   lightly and use a heating pad.  - Sore Throat: treat with throat lozenges and/or gargle with warm salt   water.  SYMPTOMS TO WATCH FOR AND REPORT TO YOUR PHYSICIAN:  1. Abdominal pain or bloating, other than gas cramps.  2. Chest pain.  3. Back pain.  4. Chills or fever occurring within 24 hours after the procedure.  5. Rectal bleeding, which would show as bright red, maroon, or black stools.   (A tablespoon of blood from the rectum is not serious, especially if   hemorrhoids are present.)  6. Vomiting.  7. Weakness or dizziness.  8. Because air was used during the procedure, expelling large amounts of air   from your rectum or belching is normal.  9. If a bowel prep was taken, you may not have a bowel movement for 1-3   days.  This is normal.  GO DIRECTLY TO THE NEAREST EMERGENCY ROOM IF YOU HAVE ANY OF THE FOLLOWING:      Difficulty breathing  Chills and/or fever over 101 F   Persistent vomiting and/or vomiting blood   Severe abdominal pain   Severe chest pain   Black, tarry stools   Bleeding- more than one tablespoon   Any other symptom or condition that you may feel  needs urgent attention  Your doctor recommends these additional instructions:  If any biopsies were taken, your doctor s clinic will contact you in 1 to 2   weeks with any results.  Watch for symptoms of pancreatitis, bleeding, perforation and cholangitis.   Avoid aspirin and nonsteroidal anti-inflammatory medications for two weeks.     Your physician has recommended a repeat ERCP in four weeks to remove stent.     For questions, problems or results please call your physician - Tray Hsu MD at Work:  [phone].  [facility], EMERGENCY ROOM PHONE NUMBER: [phone]  IF A COMPLICATION OR EMERGENCY SITUATION ARISES AND YOU ARE UNABLE TO REACH   YOUR PHYSICIAN - GO DIRECTLY TO THE EMERGENCY ROOM.  Tray Hsu MD  12/8/2017 1:35:17 PM  This report has been verified and signed electronically.
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
## 2017-12-08 NOTE — PLAN OF CARE
Problem: Patient Care Overview  Goal: Plan of Care Review  Outcome: Ongoing (interventions implemented as appropriate)  Pt came from ED on ambulance. Pt on 3l N[place], pt c/o of headache and nausea. Zofran was given and nausea subsided. 50 mL/hr NS, daughter at bedside. Nasal spray was given to and headache subsided. Will continue to monitor.

## 2017-12-08 NOTE — NURSING TRANSFER
Nursing Transfer Note      12/8/2017     Transfer To: 1023 from pacu 2    Transfer via stretcher    Transfer with 1L nc portable oxygen    Transported by pacu pct    Medicines sent: none.  Floor rn to give rest of meds not given in pacu    Chart send with patient: Yes    Notified: daughter    Patient reassessed at: 12/8/17 3023

## 2017-12-08 NOTE — ASSESSMENT & PLAN NOTE
Patient takes glimeperide at home  Holding home glimeperide  Started on aspart low dose sliding scale

## 2017-12-09 ENCOUNTER — TELEPHONE (OUTPATIENT)
Dept: GASTROENTEROLOGY | Facility: CLINIC | Age: 77
End: 2017-12-09

## 2017-12-09 DIAGNOSIS — K83.1 BILIARY OBSTRUCTION: Primary | ICD-10-CM

## 2017-12-09 PROBLEM — R10.13 EPIGASTRIC PAIN: Status: ACTIVE | Noted: 2017-12-09

## 2017-12-09 PROBLEM — E83.39 HYPOPHOSPHATEMIA: Status: ACTIVE | Noted: 2017-12-09

## 2017-12-09 PROBLEM — N18.30 CHRONIC KIDNEY DISEASE, STAGE III (MODERATE): Status: ACTIVE | Noted: 2017-12-09

## 2017-12-09 PROBLEM — R10.9 ABDOMINAL PAIN: Status: ACTIVE | Noted: 2017-12-09

## 2017-12-09 PROBLEM — R74.8 ABNORMAL LIVER ENZYMES: Status: ACTIVE | Noted: 2017-12-09

## 2017-12-09 LAB
ALBUMIN SERPL BCP-MCNC: 2.9 G/DL
ALP SERPL-CCNC: 114 U/L
ALT SERPL W/O P-5'-P-CCNC: 126 U/L
ANION GAP SERPL CALC-SCNC: 5 MMOL/L
AST SERPL-CCNC: 44 U/L
BASOPHILS # BLD AUTO: 0.02 K/UL
BASOPHILS NFR BLD: 0.3 %
BILIRUB SERPL-MCNC: 0.7 MG/DL
BUN SERPL-MCNC: 13 MG/DL
CALCIUM SERPL-MCNC: 9 MG/DL
CHLORIDE SERPL-SCNC: 101 MMOL/L
CO2 SERPL-SCNC: 33 MMOL/L
CREAT SERPL-MCNC: 1 MG/DL
DIFFERENTIAL METHOD: ABNORMAL
EOSINOPHIL # BLD AUTO: 0.2 K/UL
EOSINOPHIL NFR BLD: 2.7 %
ERYTHROCYTE [DISTWIDTH] IN BLOOD BY AUTOMATED COUNT: 13.7 %
EST. GFR  (AFRICAN AMERICAN): >60 ML/MIN/1.73 M^2
EST. GFR  (NON AFRICAN AMERICAN): 54.1 ML/MIN/1.73 M^2
GLUCOSE SERPL-MCNC: 172 MG/DL
HCT VFR BLD AUTO: 35 %
HGB BLD-MCNC: 11.4 G/DL
IMM GRANULOCYTES # BLD AUTO: 0.02 K/UL
IMM GRANULOCYTES NFR BLD AUTO: 0.3 %
LIPASE SERPL-CCNC: 132 U/L
LYMPHOCYTES # BLD AUTO: 0.7 K/UL
LYMPHOCYTES NFR BLD: 10.4 %
MCH RBC QN AUTO: 29.9 PG
MCHC RBC AUTO-ENTMCNC: 32.6 G/DL
MCV RBC AUTO: 92 FL
MONOCYTES # BLD AUTO: 0.9 K/UL
MONOCYTES NFR BLD: 13.2 %
NEUTROPHILS # BLD AUTO: 4.9 K/UL
NEUTROPHILS NFR BLD: 73.1 %
NRBC BLD-RTO: 0 /100 WBC
PLATELET # BLD AUTO: 113 K/UL
PMV BLD AUTO: 11.2 FL
POCT GLUCOSE: 155 MG/DL (ref 70–110)
POCT GLUCOSE: 158 MG/DL (ref 70–110)
POCT GLUCOSE: 178 MG/DL (ref 70–110)
POCT GLUCOSE: 205 MG/DL (ref 70–110)
POTASSIUM SERPL-SCNC: 3.5 MMOL/L
PROCALCITONIN SERPL IA-MCNC: 0.22 NG/ML
PROT SERPL-MCNC: 6.6 G/DL
RBC # BLD AUTO: 3.81 M/UL
SODIUM SERPL-SCNC: 139 MMOL/L
WBC # BLD AUTO: 6.66 K/UL

## 2017-12-09 PROCEDURE — 80053 COMPREHEN METABOLIC PANEL: CPT

## 2017-12-09 PROCEDURE — 99233 SBSQ HOSP IP/OBS HIGH 50: CPT | Mod: ,,, | Performed by: INTERNAL MEDICINE

## 2017-12-09 PROCEDURE — 20600001 HC STEP DOWN PRIVATE ROOM

## 2017-12-09 PROCEDURE — 25000003 PHARM REV CODE 250: Performed by: STUDENT IN AN ORGANIZED HEALTH CARE EDUCATION/TRAINING PROGRAM

## 2017-12-09 PROCEDURE — 85025 COMPLETE CBC W/AUTO DIFF WBC: CPT

## 2017-12-09 PROCEDURE — 83690 ASSAY OF LIPASE: CPT

## 2017-12-09 PROCEDURE — 36415 COLL VENOUS BLD VENIPUNCTURE: CPT

## 2017-12-09 PROCEDURE — 99233 SBSQ HOSP IP/OBS HIGH 50: CPT | Mod: GC,,, | Performed by: INTERNAL MEDICINE

## 2017-12-09 PROCEDURE — 63600175 PHARM REV CODE 636 W HCPCS: Performed by: INTERNAL MEDICINE

## 2017-12-09 PROCEDURE — 27000221 HC OXYGEN, UP TO 24 HOURS

## 2017-12-09 PROCEDURE — 63600175 PHARM REV CODE 636 W HCPCS: Performed by: STUDENT IN AN ORGANIZED HEALTH CARE EDUCATION/TRAINING PROGRAM

## 2017-12-09 PROCEDURE — 84145 PROCALCITONIN (PCT): CPT

## 2017-12-09 PROCEDURE — 25000003 PHARM REV CODE 250: Performed by: INTERNAL MEDICINE

## 2017-12-09 RX ORDER — ACETAMINOPHEN 325 MG/1
650 TABLET ORAL EVERY 6 HOURS PRN
Status: DISCONTINUED | OUTPATIENT
Start: 2017-12-09 | End: 2017-12-09

## 2017-12-09 RX ORDER — ALPRAZOLAM 0.25 MG/1
0.5 TABLET ORAL NIGHTLY PRN
Status: COMPLETED | OUTPATIENT
Start: 2017-12-09 | End: 2017-12-09

## 2017-12-09 RX ORDER — ALPRAZOLAM 0.25 MG/1
0.25 TABLET ORAL NIGHTLY PRN
Status: DISCONTINUED | OUTPATIENT
Start: 2017-12-09 | End: 2017-12-09

## 2017-12-09 RX ORDER — FAMOTIDINE 20 MG/1
20 TABLET, FILM COATED ORAL DAILY
Status: DISCONTINUED | OUTPATIENT
Start: 2017-12-09 | End: 2017-12-11 | Stop reason: HOSPADM

## 2017-12-09 RX ORDER — ACETAMINOPHEN 325 MG/1
650 TABLET ORAL EVERY 8 HOURS PRN
Status: DISCONTINUED | OUTPATIENT
Start: 2017-12-09 | End: 2017-12-11 | Stop reason: HOSPADM

## 2017-12-09 RX ADMIN — DONEPEZIL HYDROCHLORIDE 10 MG: 5 TABLET, FILM COATED ORAL at 09:12

## 2017-12-09 RX ADMIN — METRONIDAZOLE 500 MG: 500 TABLET ORAL at 02:12

## 2017-12-09 RX ADMIN — ALPRAZOLAM 0.5 MG: 0.25 TABLET ORAL at 09:12

## 2017-12-09 RX ADMIN — INSULIN ASPART 2 UNITS: 100 INJECTION, SOLUTION INTRAVENOUS; SUBCUTANEOUS at 11:12

## 2017-12-09 RX ADMIN — Medication 10 MG: at 12:12

## 2017-12-09 RX ADMIN — IRBESARTAN 300 MG: 300 TABLET ORAL at 09:12

## 2017-12-09 RX ADMIN — CIPROFLOXACIN HYDROCHLORIDE 500 MG: 500 TABLET, FILM COATED ORAL at 09:12

## 2017-12-09 RX ADMIN — CIPROFLOXACIN HYDROCHLORIDE 500 MG: 500 TABLET, FILM COATED ORAL at 05:12

## 2017-12-09 RX ADMIN — ENOXAPARIN SODIUM 40 MG: 100 INJECTION SUBCUTANEOUS at 05:12

## 2017-12-09 RX ADMIN — URSODIOL 750 MG: 250 TABLET, FILM COATED ORAL at 12:12

## 2017-12-09 RX ADMIN — ACETAMINOPHEN 650 MG: 325 TABLET ORAL at 07:12

## 2017-12-09 RX ADMIN — FLUOXETINE 20 MG: 20 CAPSULE ORAL at 12:12

## 2017-12-09 RX ADMIN — HYDROXYCHLOROQUINE SULFATE 200 MG: 200 TABLET, FILM COATED ORAL at 09:12

## 2017-12-09 RX ADMIN — AMITRIPTYLINE HYDROCHLORIDE 10 MG: 10 TABLET, FILM COATED ORAL at 09:12

## 2017-12-09 RX ADMIN — ONDANSETRON 4 MG: 2 INJECTION INTRAMUSCULAR; INTRAVENOUS at 08:12

## 2017-12-09 RX ADMIN — AMLODIPINE BESYLATE 10 MG: 10 TABLET ORAL at 09:12

## 2017-12-09 RX ADMIN — FAMOTIDINE 20 MG: 20 TABLET, FILM COATED ORAL at 05:12

## 2017-12-09 RX ADMIN — PILOCARPINE HYDROCHLORIDE 5 MG: 5 TABLET, FILM COATED ORAL at 09:12

## 2017-12-09 RX ADMIN — PILOCARPINE HYDROCHLORIDE 10 MG: 5 TABLET, FILM COATED ORAL at 12:12

## 2017-12-09 RX ADMIN — BISOPROLOL FUMARATE 5 MG: 5 TABLET, COATED ORAL at 12:12

## 2017-12-09 RX ADMIN — URSODIOL 750 MG: 250 TABLET, FILM COATED ORAL at 09:12

## 2017-12-09 RX ADMIN — HYDROXYCHLOROQUINE SULFATE 200 MG: 200 TABLET, FILM COATED ORAL at 12:12

## 2017-12-09 RX ADMIN — ASPIRIN 81 MG: 81 TABLET, COATED ORAL at 11:12

## 2017-12-09 RX ADMIN — METRONIDAZOLE 500 MG: 500 TABLET ORAL at 05:12

## 2017-12-09 RX ADMIN — METRONIDAZOLE 500 MG: 500 TABLET ORAL at 09:12

## 2017-12-09 NOTE — PROGRESS NOTES
[facility]  Advanced Endoscopy Service  Progress Note    Patient Name: Justa Acosta  MRN: [id number]  Admission Date: 12/7/2017  Hospital Length of Stay: 2 days  Code Status: Full Code   Attending Provider: Celine Tsang MD  Consulting Provider: Alexis Long MD  Primary Care Physician: Yordy Collier MD  Principal Problem: Transaminitis      Subjective:     Interval History:   Patient s/p EUS/ERCP yesterday.  She was able to have dinner last night without nausea/emesis.  However this morning experienced 8/10 non-radiating epigastric pain which started around 7 am.    Review of Systems   Constitutional: Negative for activity change, appetite change, chills, diaphoresis and fatigue.   HENT: Negative for trouble swallowing.    Eyes: Negative.    Respiratory: Negative.    Cardiovascular: Negative.    Gastrointestinal: Positive for abdominal pain. Negative for abdominal distention, anal bleeding, blood in stool, constipation, diarrhea, nausea, rectal pain and vomiting.   Genitourinary: Negative.    Musculoskeletal: Negative.      Objective:     Vital Signs (Most Recent):  Temp: 98.2 °F (36.8 °C) (12/09/17 1209)  Pulse: (!) 53 (12/09/17 1209)  Resp: 18 (12/09/17 1209)  BP: 132/63 (12/09/17 1209)  SpO2: 98 % (12/09/17 1209) Vital Signs (24h Range):  Temp:  [97.9 °F (36.6 °C)-99.3 °F (37.4 °C)] 98.2 °F (36.8 °C)  Pulse:  [53-90] 53  Resp:  [10-18] 18  SpO2:  [95 %-100 %] 98 %  BP: (107-184)/(53-70) 132/63     Weight: 82.6 kg (182 lb) (12/08/17 1130)  Body mass index is 33.29 kg/m².    No intake or output data in the 24 hours ending 12/09/17 1521    Lines/Drains/Airways     Epidural Line                 Perineural Analgesia/Anesthesia Assessment (using dermatomes) Epidural 02/20/13 0645 1753 days          Peripheral Intravenous Line                 Peripheral IV - Single Lumen 04/04/13 Right Forearm 1710 days         Peripheral IV - Single Lumen 12/07/17 2000 Left Antecubital 1 day                 Physical Exam   Constitutional: She is oriented to person, place, and time. No distress.   HENT:   Head: Normocephalic and atraumatic.   Eyes: No scleral icterus.   Cardiovascular: Normal rate and regular rhythm.    Pulmonary/Chest: Effort normal and breath sounds normal.   Abdominal: Soft. Bowel sounds are normal. She exhibits no distension and no mass. There is tenderness. There is no rebound and no guarding. No hernia.   Musculoskeletal: She exhibits no edema.   Neurological: She is alert and oriented to person, place, and time.   Skin: She is not diaphoretic.       Significant Labs:  CBC:   Recent Labs  Lab 12/08/17 0109 12/09/17  0741   WBC 8.25 6.66   HGB 12.6 11.4*   HCT 38.2 35.0*   * 113*     CMP:   Recent Labs  Lab 12/09/17  0741   *   CALCIUM 9.0   ALBUMIN 2.9*   PROT 6.6      K 3.5   CO2 33*      BUN 13   CREATININE 1.0   ALKPHOS 114   *   AST 44*   BILITOT 0.7     Coagulation:   Recent Labs  Lab 12/08/17  0109   INR 1.1         Significant Imaging:  Imaging results within the past 24 hours have been reviewed.    Assessment/Plan:     * Transaminitis    76 yo F PMhx HTN, DM Type 2, Vascular dementia, CAD, PAD s/p aortic and b/l femoral stenting, KELI, COPD on 3-4L home O2, and viral hepatitis (per patient s/p blood transfusion years ago but unsure which virus) s/p EUS and ERCP yesterday.    EUS/ERCP was remarkable for:  - The entire main bile duct was severely dilated.  - One pancreatic stent was placed into the ventral pancreatic duct.  - A biliary sphincterotomy was performed.  - The biliary tree was swept and nothing was found.  - One biliary stent was placed into the common bile duct.     In the setting of epigastric pain recommend not advancing diet and continuing to monitor patient.    Recommendations:  -Continue CLD due to ongoing pain  -Will continue to follow alongside primary team             Thank you for your consult. I will follow-up with patient. Please  contact us if you have any additional questions.    Alexis Long M.D.  Gastroenterology Fellow, PGY-IV  Pager: [phone]  [facility]

## 2017-12-09 NOTE — ASSESSMENT & PLAN NOTE
78 yo F PMhx HTN, DM Type 2, Vascular dementia, CAD, PAD s/p aortic and b/l femoral stenting, KELI, COPD on 3-4L home O2, and viral hepatitis (per patient s/p blood transfusion years ago but unsure which virus) s/p EUS and ERCP yesterday.    EUS/ERCP was remarkable for:  - The entire main bile duct was severely dilated.  - One pancreatic stent was placed into the ventral pancreatic duct.  - A biliary sphincterotomy was performed.  - The biliary tree was swept and nothing was found.  - One biliary stent was placed into the common bile duct.     In the setting of epigastric pain recommend not advancing diet and continuing to monitor patient.    Recommendations:  -Continue CLD due to ongoing pain  -Will continue to follow alongside primary team

## 2017-12-09 NOTE — ASSESSMENT & PLAN NOTE
- Improved from OSH records prior to admission  - , ; ratio   - Per patient and daughter pt w/ viral hepatitis (unsure of which virus), state this was contracted from blood transfusion she received years ago, states doctor at that time passed away and pt does not have record. Acute hep panel is negative here  - Received EUS and ERCP on 12/9. entire main bile duct was severely dilated (21 mm). No stones noted. Sludge in CBD. Received 1 pancreatic stent and 1 CBD stent.

## 2017-12-09 NOTE — PLAN OF CARE
Problem: Patient Care Overview  Goal: Plan of Care Review  Outcome: Ongoing (interventions implemented as appropriate)  Pt with no falls or injuries this shift. Pt with no s/s hypo or hyperglycemia this shift. Pt with no skin breakdown.
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
## 2017-12-09 NOTE — ASSESSMENT & PLAN NOTE
76 yo F PMhx vascular dementia, CAD, PAD s/p aortic and b/l femoral stenting, KELI, Sjrogren's, COPD on 3-4L home O2, HTN, DM and viral hepatitis, PShx cholecystectomy who presented s/p transfer for ERCP due to recurrent fever, nausea, disorientation, and rigors. We are consulted for recurrent sepsis concerns of biliary source.    Pt had ERCP yesterday which showed main bile duct to be dilated, no obstructions seen. Pancreatic and biliary stent placed. Pt remained afebrile here, stable, nonseptic appearing, without a leukocytosis. AST/ALT 44/126 today. Pt plans to have repeat ERCP in 4 weeks for stents removal. Blood cultures here have remain negative to date. procalcitonin negative.      Plan  1.Recommend continuing ciprofloxacin and flagyl x 14 days for sepsis possible 2/2 intra-abdominal source. Pt closely follows with ID in [facility]. Recommend close follow up with ID once discharged.   2. Please take ciprofloxacin 2 hours before or after mineral supplements, oral multivitamins, dairy products, or calcium-fortified juices due to food-drug interactions.  3.pt remain stable, afebrile, without a leukocytosis.     Seen and discussed with ID staff. Discussed with primary team. Will sign off.

## 2017-12-09 NOTE — PLAN OF CARE
Problem: Patient Care Overview  Goal: Plan of Care Review  Outcome: Ongoing (interventions implemented as appropriate)  Patient AAOx4. AVSS. Afebrile. Complained of back pain, wedge provided for support. BG WNL. PO abx administered. No reports of nausea. WCTM

## 2017-12-09 NOTE — SUBJECTIVE & OBJECTIVE
Interval History: s/p EUS and ERCP, biliary and pancreatic stents placed.  Patient complaining of continuous epigastric pain this AM, not associated with food. Denies fever/chills, SOB, but does have some nausea.    Review of Systems   Constitutional: Negative for appetite change, chills and fever.   Eyes: Negative for photophobia.   Respiratory: Negative for cough and shortness of breath.    Cardiovascular: Negative for chest pain.   Gastrointestinal: Positive for abdominal pain and nausea.   Genitourinary: Negative for dysuria.   Musculoskeletal: Positive for back pain, joint swelling (R ankle) and neck pain.   Skin: Negative for rash.   Neurological: Negative for speech difficulty and headaches.   Psychiatric/Behavioral: Negative for agitation and confusion.     Objective:     Vital Signs (Most Recent):  Temp: 98.2 °F (36.8 °C) (12/09/17 1209)  Pulse: (!) 53 (12/09/17 1209)  Resp: 18 (12/09/17 1209)  BP: 132/63 (12/09/17 1209)  SpO2: 98 % (12/09/17 1209) Vital Signs (24h Range):  Temp:  [97.8 °F (36.6 °C)-99.3 °F (37.4 °C)] 98.2 °F (36.8 °C)  Pulse:  [53-90] 53  Resp:  [10-20] 18  SpO2:  [95 %-100 %] 98 %  BP: (107-188)/(53-76) 132/63     Weight: 82.6 kg (182 lb)  Body mass index is 33.29 kg/m².    Intake/Output Summary (Last 24 hours) at 12/09/17 1250  Last data filed at 12/08/17 1319   Gross per 24 hour   Intake              500 ml   Output                0 ml   Net              500 ml      Physical Exam   Constitutional: She is oriented to person, place, and time. She appears well-developed and well-nourished. No distress.   HENT:   Head: Normocephalic and atraumatic.   Eyes: Conjunctivae and EOM are normal. No scleral icterus.   Neck: Normal range of motion. Neck supple.   Cardiovascular: Normal rate, regular rhythm and intact distal pulses.    No murmur heard.  Pulmonary/Chest: Breath sounds normal. No respiratory distress.   On 3 L NC   Abdominal: Soft. She exhibits no distension. There is tenderness  (epigastric). There is no rebound and no guarding.   Open cholecystectomy scar  Striae on abdomen   Musculoskeletal: She exhibits edema (R ankle).   R leg with vertical surgical incision s/p hardware   Neurological: She is alert and oriented to person, place, and time.   Skin: No rash noted.   Psychiatric: She has a normal mood and affect.       Significant Labs: All pertinent labs within the past 24 hours have been reviewed.    Significant Imaging: I have reviewed all pertinent imaging results/findings within the past 24 hours.

## 2017-12-09 NOTE — PROGRESS NOTES
"[facility] Hospital Medicine  Progress Note    Patient Name: Justa Acosta  MRN: [id number]  Patient Class: IP- Inpatient   Admission Date: 12/7/2017  Length of Stay: 2 days  Attending Physician: Celine Tsang MD  Primary Care Provider: Yordy Collier MD    [facility] Medicine Team: [facility] HOSP MED 5 iMk Dennison MD    Subjective:     Principal Problem:Transaminitis    HPI:  76 yo F PMhx vascular dementia, CAD, PAD s/p aortic and b/l femoral stenting, KELI, Sjrogren's, COPD on 3L home O2, HTN, DM and viral hepatitis (per patient s/p blood transfusion years ago but unsure which virus), PShx cholecystectomy who presented s/p transfer with fever, nausea, disorientation, and "whole body shaking." Per daughter home health nurse noted patient's saturation 70s on home 3L which corrected when increased to 5L, however patient also had the above symptoms therefore daughter brought patient to ED.    Patient had initially been seen by her GI physician for some time for elevated liver enzymes. She was admitted to [facility] once in April and again on Oct 31st where she presented with fever, diorientation, nausea, and further elevation of liver enzymes as high as 400s. Patient was seen by ID who was concerned for biliary source given imaging which had shown CBD dilation. During her most recent admission in late October-November patient was bacteremic with K. pneumo. Per transfer note sensitivies (S: cipro, ertapenem, Imipinem, Gent, Zosyn and augmentin) and (R: Cefepime, Rocephin, Ancef, Amp). She was discharged home on PO antibiotics just like in April, patient and daughter unsure of which antibiotic. Patient was scheduled for oupatient EUS/ERCP at [facility] on 12/18 for further evaluation. Per daughter each time patient presents with similar symptoms as outlined above she is found to be septic. In OSH ED patient was febrile s/p meropenem x 1, cirpo IV x 1, as well tylenol and her fever subsequently " Defervesced.    Per transfer note the case was discussed with Dr. Hsu from the transfer center, who agreed to assist in the care of the patient.    Hospital Course:  No notes on file    Interval History: s/p EUS and ERCP, biliary and pancreatic stents placed.  Patient complaining of continuous epigastric pain this AM, not associated with food. Denies fever/chills, SOB, but does have some nausea.    Review of Systems   Constitutional: Negative for appetite change, chills and fever.   Eyes: Negative for photophobia.   Respiratory: Negative for cough and shortness of breath.    Cardiovascular: Negative for chest pain.   Gastrointestinal: Positive for abdominal pain and nausea.   Genitourinary: Negative for dysuria.   Musculoskeletal: Positive for back pain, joint swelling (R ankle) and neck pain.   Skin: Negative for rash.   Neurological: Negative for speech difficulty and headaches.   Psychiatric/Behavioral: Negative for agitation and confusion.     Objective:     Vital Signs (Most Recent):  Temp: 98.2 °F (36.8 °C) (12/09/17 1209)  Pulse: (!) 53 (12/09/17 1209)  Resp: 18 (12/09/17 1209)  BP: 132/63 (12/09/17 1209)  SpO2: 98 % (12/09/17 1209) Vital Signs (24h Range):  Temp:  [97.8 °F (36.6 °C)-99.3 °F (37.4 °C)] 98.2 °F (36.8 °C)  Pulse:  [53-90] 53  Resp:  [10-20] 18  SpO2:  [95 %-100 %] 98 %  BP: (107-188)/(53-76) 132/63     Weight: 82.6 kg (182 lb)  Body mass index is 33.29 kg/m².    Intake/Output Summary (Last 24 hours) at 12/09/17 1250  Last data filed at 12/08/17 1319   Gross per 24 hour   Intake              500 ml   Output                0 ml   Net              500 ml      Physical Exam   Constitutional: She is oriented to person, place, and time. She appears well-developed and well-nourished. No distress.   HENT:   Head: Normocephalic and atraumatic.   Eyes: Conjunctivae and EOM are normal. No scleral icterus.   Neck: Normal range of motion. Neck supple.   Cardiovascular: Normal rate, regular rhythm and  intact distal pulses.    No murmur heard.  Pulmonary/Chest: Breath sounds normal. No respiratory distress.   On 3 L NC   Abdominal: Soft. She exhibits no distension. There is tenderness (epigastric). There is no rebound and no guarding.   Open cholecystectomy scar  Striae on abdomen   Musculoskeletal: She exhibits edema (R ankle).   R leg with vertical surgical incision s/p hardware   Neurological: She is alert and oriented to person, place, and time.   Skin: No rash noted.   Psychiatric: She has a normal mood and affect.       Significant Labs: All pertinent labs within the past 24 hours have been reviewed.    Significant Imaging: I have reviewed all pertinent imaging results/findings within the past 24 hours.    Assessment/Plan:      * Transaminitis    - Improved from OSH records prior to admission  - , ; ratio   - Per patient and daughter pt w/ viral hepatitis (unsure of which virus), state this was contracted from blood transfusion she received years ago, states doctor at that time passed away and pt does not have record. Acute hep panel is negative here  - Received EUS and ERCP on 12/9. entire main bile duct was severely dilated (21 mm). No stones noted. Sludge in CBD. Received 1 pancreatic stent and 1 CBD stent.            Epigastric pain    - Could be post-ERCP pancreatitis  - check lipase  - could trial patient on PPI          HTN (hypertension)    Patient takes bisoprolol, irbesartan, and norvasc at home  Restart medications as appropriate        Vascular dementia    C/w home aricept, namenda          DM (diabetes mellitus)    Patient takes glimeperide at home  Holding home glimeperide  Started on aspart low dose sliding scale          Fever    - Patient with multiple admission to [facility] for sepsis, bacteremia on last admission in Oct-Nov w/ Klebsiella. Currently no leukocytosis  - S/p meropenem x 1, cipro x 1 at OSH. Received meropenem x 1 here  - Concern for biliary source. S/p EUS and  ERCP, 1 pancreatic stent and 1 biliary stent placed  - Blood cultures NGTD  - Continue cipro and flagyl for gram negative and anearobic coverage. ID consult in place  - Will check OSH records for any growth prior to transfer            VTE Risk Mitigation         Ordered     enoxaparin injection 40 mg  Daily     Route:  Subcutaneous        12/08/17 170              Mik Dennison MD  Department of Hospital Medicine   [facility]

## 2017-12-09 NOTE — ASSESSMENT & PLAN NOTE
- Patient with multiple admission to [facility] for sepsis, bacteremia on last admission in Oct-Nov w/ Klebsiella. Currently no leukocytosis  - S/p meropenem x 1, cipro x 1 at OSH. Received meropenem x 1 here  - Concern for biliary source. S/p EUS and ERCP, 1 pancreatic stent and 1 biliary stent placed  - Blood cultures NGTD  - Continue cipro and flagyl for gram negative and anearobic coverage. ID consult in place  - Will check OSH records for any growth prior to transfer

## 2017-12-09 NOTE — TELEPHONE ENCOUNTER
----- Message from Mike Almanza MD sent at 12/8/2017  3:41 PM CST -----  Patient needs repeat ERCP in 4 weeks to remove stent.

## 2017-12-09 NOTE — HPI
76 yo F PMhx vascular dementia, CAD, PAD s/p aortic and b/l femoral stenting, KELI, Sjrogren's, COPD on 3L home O2, HTN, DM and viral hepatitis, PShx cholecystectomy who was transferred from [facility] for fevers, n/v/, disorientation and rigors. Pt was trasnferred here for ERCP, concerns for biliary source.      Per chart review and daughter, patient had initially been seen by her GI physician for some time for elevated LFTs. She was admitted to [facility] once in April and again on Oct 31st where she presented with fever, diorientation, nausea, and further elevation of liver enzymes as high as 400s. Patient was seen by ID who was concerned for biliary source given imaging which had shown CBD dilation. During her most recent admission in late October-November patient was bacteremic with K. pneumo. Per transfer note sensitivies (S: cipro, ertapenem, Imipinem, Gent, Zosyn and augmentin) and (R: Cefepime, Rocephin, Ancef, Amp). She was discharged home on PO antibiotics just like in April, patient and daughter unsure of which antibiotic. Patient was scheduled for oupatient EUS/ERCP at [facility] on 12/18 for further evaluation. Per daughter each time patient presents with similar symptoms as outlined above she is found to be septic. In OSH ED patient was febrile s/p meropenem x 1, cirpo IV x 1, as well tylenol and her fever resolved.    Pt has not had any fevers here, stable, without a leukocytosis. Pt continues to have nausea, relieved with Zofran. Pt denies having any cough, chills, night sweats, diarrhea or dysuria. AST/ALT today 44/126. Workup essentially negative, UA, CXR negative. Blood cultures NGTD. Pt had ERCP done yesterday findings include main bile duct severely dilated, pancreatic and biliary stent placed. Pt without any acute complaints or concerns today.

## 2017-12-09 NOTE — NURSING
received report from [facility]. Pt return from ENDO via stretcher. Pt accompanied by transport associate and daughter. No distress noted.
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
## 2017-12-09 NOTE — NURSING
md notified pt did not receive am dose of vancomycin. When pt arrived to ENDO RN noticed that all fluid in bag had been delivered but, medication was still in vile.

## 2017-12-09 NOTE — SUBJECTIVE & OBJECTIVE
Subjective:     Interval History:   Patient s/p EUS/ERCP yesterday.  She was able to have dinner last night without nausea/emesis.  However this morning experienced 8/10 non-radiating epigastric pain which started around 7 am.    Review of Systems   Constitutional: Negative for activity change, appetite change, chills, diaphoresis and fatigue.   HENT: Negative for trouble swallowing.    Eyes: Negative.    Respiratory: Negative.    Cardiovascular: Negative.    Gastrointestinal: Positive for abdominal pain. Negative for abdominal distention, anal bleeding, blood in stool, constipation, diarrhea, nausea, rectal pain and vomiting.   Genitourinary: Negative.    Musculoskeletal: Negative.      Objective:     Vital Signs (Most Recent):  Temp: 98.2 °F (36.8 °C) (12/09/17 1209)  Pulse: (!) 53 (12/09/17 1209)  Resp: 18 (12/09/17 1209)  BP: 132/63 (12/09/17 1209)  SpO2: 98 % (12/09/17 1209) Vital Signs (24h Range):  Temp:  [97.9 °F (36.6 °C)-99.3 °F (37.4 °C)] 98.2 °F (36.8 °C)  Pulse:  [53-90] 53  Resp:  [10-18] 18  SpO2:  [95 %-100 %] 98 %  BP: (107-184)/(53-70) 132/63     Weight: 82.6 kg (182 lb) (12/08/17 1130)  Body mass index is 33.29 kg/m².    No intake or output data in the 24 hours ending 12/09/17 1521    Lines/Drains/Airways     Epidural Line                 Perineural Analgesia/Anesthesia Assessment (using dermatomes) Epidural 02/20/13 0645 1753 days          Peripheral Intravenous Line                 Peripheral IV - Single Lumen 04/04/13 Right Forearm 1710 days         Peripheral IV - Single Lumen 12/07/17 2000 Left Antecubital 1 day                Physical Exam   Constitutional: She is oriented to person, place, and time. No distress.   HENT:   Head: Normocephalic and atraumatic.   Eyes: No scleral icterus.   Cardiovascular: Normal rate and regular rhythm.    Pulmonary/Chest: Effort normal and breath sounds normal.   Abdominal: Soft. Bowel sounds are normal. She exhibits no distension and no mass. There is  tenderness. There is no rebound and no guarding. No hernia.   Musculoskeletal: She exhibits no edema.   Neurological: She is alert and oriented to person, place, and time.   Skin: She is not diaphoretic.       Significant Labs:  CBC:   Recent Labs  Lab 12/08/17 0109 12/09/17  0741   WBC 8.25 6.66   HGB 12.6 11.4*   HCT 38.2 35.0*   * 113*     CMP:   Recent Labs  Lab 12/09/17  0741   *   CALCIUM 9.0   ALBUMIN 2.9*   PROT 6.6      K 3.5   CO2 33*      BUN 13   CREATININE 1.0   ALKPHOS 114   *   AST 44*   BILITOT 0.7     Coagulation:   Recent Labs  Lab 12/08/17 0109   INR 1.1         Significant Imaging:  Imaging results within the past 24 hours have been reviewed.

## 2017-12-10 PROBLEM — E83.39 HYPOPHOSPHATEMIA: Status: ACTIVE | Noted: 2017-12-10

## 2017-12-10 PROBLEM — K85.90 PANCREATITIS: Status: ACTIVE | Noted: 2017-12-09

## 2017-12-10 LAB
ALBUMIN SERPL BCP-MCNC: 2.8 G/DL
ALP SERPL-CCNC: 101 U/L
ALT SERPL W/O P-5'-P-CCNC: 82 U/L
ANION GAP SERPL CALC-SCNC: 7 MMOL/L
AST SERPL-CCNC: 25 U/L
BASOPHILS # BLD AUTO: 0.03 K/UL
BASOPHILS NFR BLD: 0.4 %
BILIRUB SERPL-MCNC: 0.5 MG/DL
BUN SERPL-MCNC: 12 MG/DL
CALCIUM SERPL-MCNC: 8.9 MG/DL
CHLORIDE SERPL-SCNC: 103 MMOL/L
CO2 SERPL-SCNC: 29 MMOL/L
CREAT SERPL-MCNC: 0.8 MG/DL
DIFFERENTIAL METHOD: ABNORMAL
EOSINOPHIL # BLD AUTO: 0.2 K/UL
EOSINOPHIL NFR BLD: 2.5 %
ERYTHROCYTE [DISTWIDTH] IN BLOOD BY AUTOMATED COUNT: 13.5 %
EST. GFR  (AFRICAN AMERICAN): >60 ML/MIN/1.73 M^2
EST. GFR  (NON AFRICAN AMERICAN): >60 ML/MIN/1.73 M^2
GLUCOSE SERPL-MCNC: 159 MG/DL
HCT VFR BLD AUTO: 35.2 %
HGB BLD-MCNC: 11.7 G/DL
IMM GRANULOCYTES # BLD AUTO: 0.02 K/UL
IMM GRANULOCYTES NFR BLD AUTO: 0.3 %
LYMPHOCYTES # BLD AUTO: 0.6 K/UL
LYMPHOCYTES NFR BLD: 8.8 %
MCH RBC QN AUTO: 30.2 PG
MCHC RBC AUTO-ENTMCNC: 33.2 G/DL
MCV RBC AUTO: 91 FL
MONOCYTES # BLD AUTO: 0.6 K/UL
MONOCYTES NFR BLD: 8.1 %
NEUTROPHILS # BLD AUTO: 5.7 K/UL
NEUTROPHILS NFR BLD: 79.9 %
NRBC BLD-RTO: 0 /100 WBC
PLATELET # BLD AUTO: 114 K/UL
PMV BLD AUTO: 11.3 FL
POCT GLUCOSE: 139 MG/DL (ref 70–110)
POCT GLUCOSE: 173 MG/DL (ref 70–110)
POCT GLUCOSE: 180 MG/DL (ref 70–110)
POTASSIUM SERPL-SCNC: 3.6 MMOL/L
PROT SERPL-MCNC: 6.3 G/DL
RBC # BLD AUTO: 3.88 M/UL
SODIUM SERPL-SCNC: 139 MMOL/L
WBC # BLD AUTO: 7.08 K/UL

## 2017-12-10 PROCEDURE — 25000003 PHARM REV CODE 250: Performed by: STUDENT IN AN ORGANIZED HEALTH CARE EDUCATION/TRAINING PROGRAM

## 2017-12-10 PROCEDURE — 25000003 PHARM REV CODE 250: Performed by: INTERNAL MEDICINE

## 2017-12-10 PROCEDURE — 20600001 HC STEP DOWN PRIVATE ROOM

## 2017-12-10 PROCEDURE — 36415 COLL VENOUS BLD VENIPUNCTURE: CPT

## 2017-12-10 PROCEDURE — 80053 COMPREHEN METABOLIC PANEL: CPT

## 2017-12-10 PROCEDURE — 93010 ELECTROCARDIOGRAM REPORT: CPT | Mod: ,,, | Performed by: INTERNAL MEDICINE

## 2017-12-10 PROCEDURE — 63600175 PHARM REV CODE 636 W HCPCS: Performed by: INTERNAL MEDICINE

## 2017-12-10 PROCEDURE — 85025 COMPLETE CBC W/AUTO DIFF WBC: CPT

## 2017-12-10 PROCEDURE — 99232 SBSQ HOSP IP/OBS MODERATE 35: CPT | Mod: GC,,, | Performed by: INTERNAL MEDICINE

## 2017-12-10 PROCEDURE — 63600175 PHARM REV CODE 636 W HCPCS: Performed by: STUDENT IN AN ORGANIZED HEALTH CARE EDUCATION/TRAINING PROGRAM

## 2017-12-10 PROCEDURE — 93005 ELECTROCARDIOGRAM TRACING: CPT

## 2017-12-10 RX ORDER — SODIUM CHLORIDE 9 MG/ML
INJECTION, SOLUTION INTRAVENOUS CONTINUOUS
Status: DISCONTINUED | OUTPATIENT
Start: 2017-12-10 | End: 2017-12-10

## 2017-12-10 RX ORDER — TRAMADOL HYDROCHLORIDE 50 MG/1
50 TABLET ORAL ONCE
Status: COMPLETED | OUTPATIENT
Start: 2017-12-10 | End: 2017-12-11

## 2017-12-10 RX ORDER — ALPRAZOLAM 0.25 MG/1
0.25 TABLET ORAL NIGHTLY
Status: DISCONTINUED | OUTPATIENT
Start: 2017-12-10 | End: 2017-12-11 | Stop reason: HOSPADM

## 2017-12-10 RX ORDER — POLYETHYLENE GLYCOL 3350 17 G/17G
17 POWDER, FOR SOLUTION ORAL
Status: DISCONTINUED | OUTPATIENT
Start: 2017-12-10 | End: 2017-12-11 | Stop reason: HOSPADM

## 2017-12-10 RX ADMIN — SODIUM CHLORIDE: 0.9 INJECTION, SOLUTION INTRAVENOUS at 10:12

## 2017-12-10 RX ADMIN — METRONIDAZOLE 500 MG: 500 TABLET ORAL at 10:12

## 2017-12-10 RX ADMIN — CIPROFLOXACIN HYDROCHLORIDE 500 MG: 500 TABLET, FILM COATED ORAL at 09:12

## 2017-12-10 RX ADMIN — FLUOXETINE 20 MG: 20 CAPSULE ORAL at 10:12

## 2017-12-10 RX ADMIN — ASPIRIN 81 MG: 81 TABLET, COATED ORAL at 10:12

## 2017-12-10 RX ADMIN — METRONIDAZOLE 500 MG: 500 TABLET ORAL at 09:12

## 2017-12-10 RX ADMIN — ALPRAZOLAM 0.25 MG: 0.25 TABLET ORAL at 09:12

## 2017-12-10 RX ADMIN — AMITRIPTYLINE HYDROCHLORIDE 10 MG: 10 TABLET, FILM COATED ORAL at 09:12

## 2017-12-10 RX ADMIN — IRBESARTAN 300 MG: 300 TABLET ORAL at 09:12

## 2017-12-10 RX ADMIN — DONEPEZIL HYDROCHLORIDE 10 MG: 5 TABLET, FILM COATED ORAL at 09:12

## 2017-12-10 RX ADMIN — AMLODIPINE BESYLATE 10 MG: 10 TABLET ORAL at 09:12

## 2017-12-10 RX ADMIN — METRONIDAZOLE 500 MG: 500 TABLET ORAL at 02:12

## 2017-12-10 RX ADMIN — ONDANSETRON 4 MG: 2 INJECTION INTRAMUSCULAR; INTRAVENOUS at 08:12

## 2017-12-10 RX ADMIN — URSODIOL 750 MG: 250 TABLET, FILM COATED ORAL at 10:12

## 2017-12-10 RX ADMIN — POLYETHYLENE GLYCOL 3350 17 G: 17 POWDER, FOR SOLUTION ORAL at 12:12

## 2017-12-10 RX ADMIN — ENOXAPARIN SODIUM 40 MG: 100 INJECTION SUBCUTANEOUS at 06:12

## 2017-12-10 RX ADMIN — HYDROXYCHLOROQUINE SULFATE 200 MG: 200 TABLET, FILM COATED ORAL at 09:12

## 2017-12-10 RX ADMIN — PILOCARPINE HYDROCHLORIDE 10 MG: 5 TABLET, FILM COATED ORAL at 10:12

## 2017-12-10 RX ADMIN — URSODIOL 750 MG: 250 TABLET, FILM COATED ORAL at 09:12

## 2017-12-10 RX ADMIN — ACETAMINOPHEN 650 MG: 325 TABLET ORAL at 12:12

## 2017-12-10 RX ADMIN — FAMOTIDINE 20 MG: 20 TABLET, FILM COATED ORAL at 10:12

## 2017-12-10 RX ADMIN — BISOPROLOL FUMARATE 5 MG: 5 TABLET, COATED ORAL at 10:12

## 2017-12-10 RX ADMIN — Medication 0.12 MG: at 02:12

## 2017-12-10 RX ADMIN — HYDROXYCHLOROQUINE SULFATE 200 MG: 200 TABLET, FILM COATED ORAL at 10:12

## 2017-12-10 RX ADMIN — CIPROFLOXACIN HYDROCHLORIDE 500 MG: 500 TABLET, FILM COATED ORAL at 10:12

## 2017-12-10 RX ADMIN — SODIUM CHLORIDE: 0.9 INJECTION, SOLUTION INTRAVENOUS at 09:12

## 2017-12-10 RX ADMIN — Medication 10 MG: at 10:12

## 2017-12-10 RX ADMIN — ACETAMINOPHEN 650 MG: 325 TABLET ORAL at 09:12

## 2017-12-10 RX ADMIN — PILOCARPINE HYDROCHLORIDE 5 MG: 5 TABLET, FILM COATED ORAL at 09:12

## 2017-12-10 NOTE — PROGRESS NOTES
"[facility] Hospital Medicine  Progress Note    Patient Name: Justa Acosta  MRN: [id number]  Patient Class: IP- Inpatient   Admission Date: 12/7/2017  Length of Stay: 3 days  Attending Physician: Celine Tsang MD  Primary Care Provider: Yordy Collier MD    [place] [facility] Medicine Team: [facility] 5 Jyoti Cruz MD    Subjective:     Principal Problem:Transaminitis    HPI:  78 yo F PMhx vascular dementia, CAD, PAD s/p aortic and b/l femoral stenting, KELI, Sjrogren's, COPD on 3L home O2, HTN, DM and viral hepatitis (per patient s/p blood transfusion years ago but unsure which virus), PShx cholecystectomy who presented s/p transfer with fever, nausea, disorientation, and "whole body shaking." Per daughter home health nurse noted patient's saturation 70s on home 3L which corrected when increased to 5L, however patient also had the above symptoms therefore daughter brought patient to ED.    Patient had initially been seen by her GI physician for some time for elevated liver enzymes. She was admitted to [facility] once in April and again on Oct 31st where she presented with fever, diorientation, nausea, and further elevation of liver enzymes as high as 400s. Patient was seen by ID who was concerned for biliary source given imaging which had shown CBD dilation. During her most recent admission in late October-November patient was bacteremic with K. pneumo. Per transfer note sensitivies (S: cipro, ertapenem, Imipinem, Gent, Zosyn and augmentin) and (R: Cefepime, Rocephin, Ancef, Amp). She was discharged home on PO antibiotics just like in April, patient and daughter unsure of which antibiotic. Patient was scheduled for oupatient EUS/ERCP at [facility] on 12/18 for further evaluation. Per daughter each time patient presents with similar symptoms as outlined above she is found to be septic. In OSH ED patient was febrile s/p meropenem x 1, cirpo IV x 1, as well tylenol and her fever subsequently " Defervesced.    Per transfer note the case was discussed with Dr. Hsu from the transfer center, who agreed to assist in the care of the patient.    Hospital Course:  No notes on file    Interval History: NAEON. S/p EUS and ERCP day 2, biliary and pancreatic stents placed.  Patient complaining of continuous epigastric pain this AM, most likely secondary to iatrogenic pancreatitis.   Denies fever/chills, SOB, but does have some nausea.    Review of Systems   Constitutional: Negative for appetite change, chills and fever.   Eyes: Negative for photophobia.   Respiratory: Negative for cough and shortness of breath.    Cardiovascular: Negative for chest pain.   Gastrointestinal: Positive for abdominal pain and nausea.   Genitourinary: Negative for dysuria.   Musculoskeletal: Positive for back pain, joint swelling (R ankle) and neck pain.   Skin: Negative for rash.   Neurological: Negative for speech difficulty and headaches.   Psychiatric/Behavioral: Negative for agitation and confusion.     Objective:     Vital Signs (Most Recent):  Temp: 98.4 °F (36.9 °C) (12/10/17 0754)  Pulse: (!) 57 (12/10/17 0754)  Resp: 20 (12/10/17 0754)  BP: (!) 165/70 (12/10/17 0754)  SpO2: 99 % (12/10/17 0754) Vital Signs (24h Range):  Temp:  [97 °F (36.1 °C)-98.7 °F (37.1 °C)] 98.4 °F (36.9 °C)  Pulse:  [51-57] 57  Resp:  [16-21] 20  SpO2:  [96 %-99 %] 99 %  BP: (126-165)/(58-70) 165/70     Weight: 82.6 kg (182 lb)  Body mass index is 33.28 kg/m².    Intake/Output Summary (Last 24 hours) at 12/10/17 1234  Last data filed at 12/09/17 1400   Gross per 24 hour   Intake              200 ml   Output                0 ml   Net              200 ml      Physical Exam   Constitutional: She is oriented to person, place, and time. She appears well-developed and well-nourished. No distress.   HENT:   Head: Normocephalic and atraumatic.   Eyes: Conjunctivae and EOM are normal. No scleral icterus.   Neck: Normal range of motion. Neck supple.    Cardiovascular: Normal rate, regular rhythm and intact distal pulses.    No murmur heard.  Pulmonary/Chest: Breath sounds normal. No respiratory distress.   On 3 L NC   Abdominal: Soft. She exhibits no distension. There is tenderness (epigastric). There is no rebound and no guarding.   Open cholecystectomy scar  Striae on abdomen   Musculoskeletal: She exhibits edema (R ankle).   R leg with vertical surgical incision s/p hardware   Neurological: She is alert and oriented to person, place, and time.   Skin: No rash noted.   Psychiatric: She has a normal mood and affect.       Significant Labs: All pertinent labs within the past 24 hours have been reviewed.    Significant Imaging: I have reviewed all pertinent imaging results/findings within the past 24 hours.    Assessment/Plan:      * Transaminitis    - Improved from OSH records prior to admission  - , ; ratio   - Per patient and daughter pt w/ viral hepatitis (unsure of which virus), state this was contracted from blood transfusion she received years ago, states doctor at that time passed away and pt does not have record. Acute hep panel is negative here  - Received EUS and ERCP on 12/9. entire main bile duct was severely dilated (21 mm). No stones noted. Sludge in CBD. Received 1 pancreatic stent and 1 CBD stent.    - AST and ALT downtreanding, s/p stent placement           Pancreatitis     post-ERCP pancreatitis  Elevated lipase    - NS continous 75cc/hr  - patient is tolerating liquids  - will advance diet             HTN (hypertension)    Patient takes bisoprolol, irbesartan, and norvasc at home  Restart medications as appropriate        Vascular dementia    C/w home aricept, namenda          DM (diabetes mellitus)    Patient takes glimeperide at home  Holding home glimeperide  Started on aspart low dose sliding scale          Fever    - Patient with multiple admission to [facility] for sepsis, bacteremia on last admission in Oct-Nov w/  Klebsiella. Currently no leukocytosis  - S/p meropenem x 1, cipro x 1 at OSH. Received meropenem x 1 here  - Concern for biliary source. S/p EUS and ERCP, 1 pancreatic stent and 1 biliary stent placed  - Blood cultures NGTD  - Continue cipro and flagyl for gram negative and anearobic coverage. ID consult in place  - Will check OSH records for any growth prior to transfer            VTE Risk Mitigation         Ordered     enoxaparin injection 40 mg  Daily     Route:  Subcutaneous        12/08/17 1701          DISPO; d/c leyla Cruz MD  Department of Hospital Medicine   [facility]

## 2017-12-10 NOTE — PLAN OF CARE
Problem: Patient Care Overview  Goal: Plan of Care Review  Outcome: Ongoing (interventions implemented as appropriate)  VSS, Aox4 and Poc discussed w/ Pt. C/o of anxiey during shift and nausea. Anti- anxiety med given and anxiety resolved. Nausea also resolved. Pt c/o of pain with movement. Skid proof socks on, daughter at bedside, pt remains free of falls and will continue to monitor.

## 2017-12-10 NOTE — ASSESSMENT & PLAN NOTE
76 yo F PMhx HTN, DM Type 2, Vascular dementia, CAD, PAD s/p aortic and b/l femoral stenting, KELI, COPD on 3-4L home O2, and viral hepatitis (per patient s/p blood transfusion years ago but unsure which virus) s/p EUS and ERCP on 12/8/17.    EUS/ERCP was remarkable for:  - The entire main bile duct was severely dilated.  - One pancreatic stent was placed into the ventral pancreatic duct.  - A biliary sphincterotomy was performed.  - The biliary tree was swept and nothing was found.  - One biliary stent was placed into the common bile duct.     Yesterday there was concern that patient's pain was related to post-ERCP pancreatitis however lipase was only 132. This morning after speaking to patient and daughter there are multiple complains which are unlikely to be related to the procedure.    Recommendations:  -OK to advance to a FLD  - Avoid aspirin and nonsteroidal anti-inflammatory medicines for 2 weeks.  - Repeat ERCP in 4 weeks to remove stent.

## 2017-12-10 NOTE — PLAN OF CARE
Patient is current with [facility].     12/10/17 1733   Discharge Assessment   Assessment Type Discharge Planning Assessment   Confirmed/corrected address and phone number on facesheet? Yes   Assessment information obtained from? Patient;Caregiver   Prior to hospitilization cognitive status: Alert/Oriented   Prior to hospitalization functional status: Independent   Current cognitive status: Alert/Oriented   Current Functional Status: Independent   Facility Arrived From: Home   Lives With spouse   Able to Return to Prior Arrangements yes   Who are your caregiver(s) and their phone number(s)? Dinh Acosta [phone] (spouse)   Do you have any problems affording any of your prescribed medications? No   Is the patient taking medications as prescribed? yes   Does the patient have transportation home? Yes   Discharge Plan A Home Health   Discharge Plan B Home Health     [facility] [place] [address]  [address]  [address]  [facility] [address]  Phone: [phone] Fax: [phone]

## 2017-12-10 NOTE — PROGRESS NOTES
[facility]  Advance Endoscopy Service  Progress Note    Patient Name: Justa Acosta  MRN: [id number]  Admission Date: 12/7/2017  Hospital Length of Stay: 3 days  Code Status: Full Code   Attending Provider: Celine Tsang MD  Consulting Provider: Alexis Long MD  Primary Care Physician: Yordy Collier MD  Principal Problem: Transaminitis      Subjective:     Interval History:   Spoke to patient and daughter this morning for ~ 30 minutes.  Daughter states that throughout the day yesterday she continued to have some nausea with no emesis and epigastric pain. She also became tearful. Then at night time her daughter that that all her symptoms could be related to not having her routine xanax. After having 1 dose patient's nausea, epigastric pain, as well as tearfulness stopped.  This morning she woke up and her main complained was severe burning from her head down her entire spine which was different than the initial epigastric pain she had yesterday.    Review of Systems   Constitutional: Negative for activity change, appetite change, chills, diaphoresis, fatigue and fever.   HENT: Negative for trouble swallowing.    Eyes: Negative.    Respiratory: Negative.    Cardiovascular: Negative.    Gastrointestinal: Positive for abdominal pain and nausea. Negative for abdominal distention, anal bleeding, blood in stool, constipation, diarrhea, rectal pain and vomiting.   Endocrine: Negative.    Genitourinary: Negative.    Neurological:        Burning down from head down entire spine     Psychiatric/Behavioral:        Tearful     Objective:     Vital Signs (Most Recent):  Temp: 97.9 °F (36.6 °C) (12/10/17 1241)  Pulse: (!) 55 (12/10/17 1241)  Resp: 18 (12/10/17 1241)  BP: (!) 132/58 (12/10/17 1241)  SpO2: 96 % (12/10/17 1241) Vital Signs (24h Range):  Temp:  [97.9 °F (36.6 °C)-98.7 °F (37.1 °C)] 97.9 °F (36.6 °C)  Pulse:  [51-57] 55  Resp:  [16-21] 18  SpO2:  [96 %-99 %] 96 %  BP: (132-165)/(58-70) 132/58      Weight: 82.6 kg (182 lb) (12/09/17 1935)  Body mass index is 33.28 kg/m².    No intake or output data in the 24 hours ending 12/10/17 1624    Lines/Drains/Airways     Epidural Line                 Perineural Analgesia/Anesthesia Assessment (using dermatomes) Epidural 02/20/13 0645 1754 days          Peripheral Intravenous Line                 Peripheral IV - Single Lumen 04/04/13 Right Forearm 1711 days         Peripheral IV - Single Lumen 12/07/17 2000 Left Antecubital 2 days                Physical Exam   Constitutional: She is oriented to person, place, and time. No distress.   HENT:   Head: Normocephalic and atraumatic.   Eyes: No scleral icterus.   Cardiovascular: Normal rate and regular rhythm.    Pulmonary/Chest: Effort normal and breath sounds normal.   On 2L NC which is patients baseline     Abdominal: Soft. Bowel sounds are normal. She exhibits no distension and no mass. There is tenderness (minimal epigatric tenderness). There is no rebound and no guarding. No hernia.   Musculoskeletal: She exhibits no edema.   Neurological: She is alert and oriented to person, place, and time.   Skin: She is not diaphoretic.       Significant Labs:  CBC:   Recent Labs  Lab 12/09/17  0741 12/10/17  1002   WBC 6.66 7.08   HGB 11.4* 11.7*   HCT 35.0* 35.2*   * 114*     CMP:   Recent Labs  Lab 12/10/17  1002   *   CALCIUM 8.9   ALBUMIN 2.8*   PROT 6.3      K 3.6   CO2 29      BUN 12   CREATININE 0.8   ALKPHOS 101   ALT 82*   AST 25   BILITOT 0.5     Coagulation: No results for input(s): PT, INR, APTT in the last 48 hours.      Significant Imaging:  Imaging results within the past 24 hours have been reviewed.    Assessment/Plan:     * Transaminitis    76 yo F PMhx HTN, DM Type 2, Vascular dementia, CAD, PAD s/p aortic and b/l femoral stenting, KELI, COPD on 3-4L home O2, and viral hepatitis (per patient s/p blood transfusion years ago but unsure which virus) s/p EUS and ERCP on  12/8/17.    EUS/ERCP was remarkable for:  - The entire main bile duct was severely dilated.  - One pancreatic stent was placed into the ventral pancreatic duct.  - A biliary sphincterotomy was performed.  - The biliary tree was swept and nothing was found.  - One biliary stent was placed into the common bile duct.     Yesterday there was concern that patient's pain was related to post-ERCP pancreatitis however lipase was only 132. This morning after speaking to patient and daughter there are multiple complains which are unlikely to be related to the procedure.    Recommendations:  -OK to advance to a FLD  - Avoid aspirin and nonsteroidal anti-inflammatory medicines for 2 weeks.  - Repeat ERCP in 4 weeks to remove stent.            Thank you for your consult, we will sign off at this point, please call us if any additional questions.    Alexis Long M.D.  Gastroenterology Fellow, PGY-IV  Pager: [phone]  [facility]

## 2017-12-10 NOTE — ASSESSMENT & PLAN NOTE
- Improved from OSH records prior to admission  - , ; ratio   - Per patient and daughter pt w/ viral hepatitis (unsure of which virus), state this was contracted from blood transfusion she received years ago, states doctor at that time passed away and pt does not have record. Acute hep panel is negative here  - Received EUS and ERCP on 12/9. entire main bile duct was severely dilated (21 mm). No stones noted. Sludge in CBD. Received 1 pancreatic stent and 1 CBD stent.    - AST and ALT downtreanding, s/p stent placement

## 2017-12-10 NOTE — SUBJECTIVE & OBJECTIVE
Interval History: NAEON. S/p EUS and ERCP day 2, biliary and pancreatic stents placed.  Patient complaining of continuous epigastric pain this AM, most likely secondary to iatrogenic pancreatitis.   Denies fever/chills, SOB, but does have some nausea.    Review of Systems   Constitutional: Negative for appetite change, chills and fever.   Eyes: Negative for photophobia.   Respiratory: Negative for cough and shortness of breath.    Cardiovascular: Negative for chest pain.   Gastrointestinal: Positive for abdominal pain and nausea.   Genitourinary: Negative for dysuria.   Musculoskeletal: Positive for back pain, joint swelling (R ankle) and neck pain.   Skin: Negative for rash.   Neurological: Negative for speech difficulty and headaches.   Psychiatric/Behavioral: Negative for agitation and confusion.     Objective:     Vital Signs (Most Recent):  Temp: 98.4 °F (36.9 °C) (12/10/17 0754)  Pulse: (!) 57 (12/10/17 0754)  Resp: 20 (12/10/17 0754)  BP: (!) 165/70 (12/10/17 0754)  SpO2: 99 % (12/10/17 0754) Vital Signs (24h Range):  Temp:  [97 °F (36.1 °C)-98.7 °F (37.1 °C)] 98.4 °F (36.9 °C)  Pulse:  [51-57] 57  Resp:  [16-21] 20  SpO2:  [96 %-99 %] 99 %  BP: (126-165)/(58-70) 165/70     Weight: 82.6 kg (182 lb)  Body mass index is 33.28 kg/m².    Intake/Output Summary (Last 24 hours) at 12/10/17 1234  Last data filed at 12/09/17 1400   Gross per 24 hour   Intake              200 ml   Output                0 ml   Net              200 ml      Physical Exam   Constitutional: She is oriented to person, place, and time. She appears well-developed and well-nourished. No distress.   HENT:   Head: Normocephalic and atraumatic.   Eyes: Conjunctivae and EOM are normal. No scleral icterus.   Neck: Normal range of motion. Neck supple.   Cardiovascular: Normal rate, regular rhythm and intact distal pulses.    No murmur heard.  Pulmonary/Chest: Breath sounds normal. No respiratory distress.   On 3 L NC   Abdominal: Soft. She exhibits  no distension. There is tenderness (epigastric). There is no rebound and no guarding.   Open cholecystectomy scar  Striae on abdomen   Musculoskeletal: She exhibits edema (R ankle).   R leg with vertical surgical incision s/p hardware   Neurological: She is alert and oriented to person, place, and time.   Skin: No rash noted.   Psychiatric: She has a normal mood and affect.       Significant Labs: All pertinent labs within the past 24 hours have been reviewed.    Significant Imaging: I have reviewed all pertinent imaging results/findings within the past 24 hours.

## 2017-12-10 NOTE — SUBJECTIVE & OBJECTIVE
Subjective:     Interval History:   Spoke to patient and daughter this morning for ~ 30 minutes.  Daughter states that throughout the day yesterday she continued to have some nausea with no emesis and epigastric pain. She also became tearful. Then at night time her daughter that that all her symptoms could be related to not having her routine xanax. After having 1 dose patient's nausea, epigastric pain, as well as tearfulness stopped.  This morning she woke up and her main complained was severe burning from her head down her entire spine which was different than the initial epigastric pain she had yesterday.    Review of Systems   Constitutional: Negative for activity change, appetite change, chills, diaphoresis, fatigue and fever.   HENT: Negative for trouble swallowing.    Eyes: Negative.    Respiratory: Negative.    Cardiovascular: Negative.    Gastrointestinal: Positive for abdominal pain and nausea. Negative for abdominal distention, anal bleeding, blood in stool, constipation, diarrhea, rectal pain and vomiting.   Endocrine: Negative.    Genitourinary: Negative.    Neurological:        Burning down from head down entire spine     Psychiatric/Behavioral:        Tearful     Objective:     Vital Signs (Most Recent):  Temp: 97.9 °F (36.6 °C) (12/10/17 1241)  Pulse: (!) 55 (12/10/17 1241)  Resp: 18 (12/10/17 1241)  BP: (!) 132/58 (12/10/17 1241)  SpO2: 96 % (12/10/17 1241) Vital Signs (24h Range):  Temp:  [97.9 °F (36.6 °C)-98.7 °F (37.1 °C)] 97.9 °F (36.6 °C)  Pulse:  [51-57] 55  Resp:  [16-21] 18  SpO2:  [96 %-99 %] 96 %  BP: (132-165)/(58-70) 132/58     Weight: 82.6 kg (182 lb) (12/09/17 1935)  Body mass index is 33.28 kg/m².    No intake or output data in the 24 hours ending 12/10/17 1624    Lines/Drains/Airways     Epidural Line                 Perineural Analgesia/Anesthesia Assessment (using dermatomes) Epidural 02/20/13 0645 1754 days          Peripheral Intravenous Line                 Peripheral IV -  Single Lumen 04/04/13 Right Forearm 1711 days         Peripheral IV - Single Lumen 12/07/17 2000 Left Antecubital 2 days                Physical Exam   Constitutional: She is oriented to person, place, and time. No distress.   HENT:   Head: Normocephalic and atraumatic.   Eyes: No scleral icterus.   Cardiovascular: Normal rate and regular rhythm.    Pulmonary/Chest: Effort normal and breath sounds normal.   On 2L NC which is patients baseline     Abdominal: Soft. Bowel sounds are normal. She exhibits no distension and no mass. There is tenderness (minimal epigatric tenderness). There is no rebound and no guarding. No hernia.   Musculoskeletal: She exhibits no edema.   Neurological: She is alert and oriented to person, place, and time.   Skin: She is not diaphoretic.       Significant Labs:  CBC:   Recent Labs  Lab 12/09/17  0741 12/10/17  1002   WBC 6.66 7.08   HGB 11.4* 11.7*   HCT 35.0* 35.2*   * 114*     CMP:   Recent Labs  Lab 12/10/17  1002   *   CALCIUM 8.9   ALBUMIN 2.8*   PROT 6.3      K 3.6   CO2 29      BUN 12   CREATININE 0.8   ALKPHOS 101   ALT 82*   AST 25   BILITOT 0.5     Coagulation: No results for input(s): PT, INR, APTT in the last 48 hours.      Significant Imaging:  Imaging results within the past 24 hours have been reviewed.

## 2017-12-10 NOTE — ASSESSMENT & PLAN NOTE
post-ERCP pancreatitis  Elevated lipase    - NS continous 75cc/hr  - patient is tolerating liquids  - will advance diet

## 2017-12-11 VITALS
HEART RATE: 51 BPM | WEIGHT: 182 LBS | RESPIRATION RATE: 18 BRPM | TEMPERATURE: 99 F | SYSTOLIC BLOOD PRESSURE: 124 MMHG | BODY MASS INDEX: 33.49 KG/M2 | HEIGHT: 62 IN | DIASTOLIC BLOOD PRESSURE: 59 MMHG | OXYGEN SATURATION: 96 %

## 2017-12-11 LAB
ALBUMIN SERPL BCP-MCNC: 2.9 G/DL
ALP SERPL-CCNC: 96 U/L
ALT SERPL W/O P-5'-P-CCNC: 62 U/L
ANION GAP SERPL CALC-SCNC: 6 MMOL/L
AST SERPL-CCNC: 19 U/L
BASOPHILS # BLD AUTO: 0.02 K/UL
BASOPHILS NFR BLD: 0.3 %
BILIRUB SERPL-MCNC: 0.5 MG/DL
BUN SERPL-MCNC: 12 MG/DL
CALCIUM SERPL-MCNC: 9.2 MG/DL
CHLORIDE SERPL-SCNC: 103 MMOL/L
CO2 SERPL-SCNC: 32 MMOL/L
CREAT SERPL-MCNC: 0.8 MG/DL
DIFFERENTIAL METHOD: ABNORMAL
EOSINOPHIL # BLD AUTO: 0.2 K/UL
EOSINOPHIL NFR BLD: 1.9 %
ERYTHROCYTE [DISTWIDTH] IN BLOOD BY AUTOMATED COUNT: 13.6 %
EST. GFR  (AFRICAN AMERICAN): >60 ML/MIN/1.73 M^2
EST. GFR  (NON AFRICAN AMERICAN): >60 ML/MIN/1.73 M^2
GLUCOSE SERPL-MCNC: 169 MG/DL
HCT VFR BLD AUTO: 35.7 %
HGB BLD-MCNC: 11.7 G/DL
IMM GRANULOCYTES # BLD AUTO: 0.03 K/UL
IMM GRANULOCYTES NFR BLD AUTO: 0.4 %
LYMPHOCYTES # BLD AUTO: 0.7 K/UL
LYMPHOCYTES NFR BLD: 8.8 %
MCH RBC QN AUTO: 30 PG
MCHC RBC AUTO-ENTMCNC: 32.8 G/DL
MCV RBC AUTO: 92 FL
MONOCYTES # BLD AUTO: 0.9 K/UL
MONOCYTES NFR BLD: 11.5 %
NEUTROPHILS # BLD AUTO: 6.1 K/UL
NEUTROPHILS NFR BLD: 77.1 %
NRBC BLD-RTO: 0 /100 WBC
PLATELET # BLD AUTO: 128 K/UL
PMV BLD AUTO: 11.5 FL
POCT GLUCOSE: 154 MG/DL (ref 70–110)
POCT GLUCOSE: 161 MG/DL (ref 70–110)
POTASSIUM SERPL-SCNC: 4 MMOL/L
PROT SERPL-MCNC: 6.6 G/DL
RBC # BLD AUTO: 3.9 M/UL
SODIUM SERPL-SCNC: 141 MMOL/L
WBC # BLD AUTO: 7.93 K/UL

## 2017-12-11 PROCEDURE — 25000003 PHARM REV CODE 250: Performed by: STUDENT IN AN ORGANIZED HEALTH CARE EDUCATION/TRAINING PROGRAM

## 2017-12-11 PROCEDURE — 85025 COMPLETE CBC W/AUTO DIFF WBC: CPT

## 2017-12-11 PROCEDURE — 99232 SBSQ HOSP IP/OBS MODERATE 35: CPT | Mod: GC,,, | Performed by: INTERNAL MEDICINE

## 2017-12-11 PROCEDURE — 80053 COMPREHEN METABOLIC PANEL: CPT

## 2017-12-11 PROCEDURE — 63600175 PHARM REV CODE 636 W HCPCS: Performed by: STUDENT IN AN ORGANIZED HEALTH CARE EDUCATION/TRAINING PROGRAM

## 2017-12-11 PROCEDURE — 36415 COLL VENOUS BLD VENIPUNCTURE: CPT

## 2017-12-11 PROCEDURE — 25000003 PHARM REV CODE 250: Performed by: INTERNAL MEDICINE

## 2017-12-11 RX ORDER — POLYETHYLENE GLYCOL 3350 17 G/17G
17 POWDER, FOR SOLUTION ORAL ONCE
Status: COMPLETED | OUTPATIENT
Start: 2017-12-11 | End: 2017-12-11

## 2017-12-11 RX ORDER — AMOXICILLIN 250 MG
2 CAPSULE ORAL ONCE
Status: COMPLETED | OUTPATIENT
Start: 2017-12-11 | End: 2017-12-11

## 2017-12-11 RX ORDER — ONDANSETRON 8 MG/1
8 TABLET, ORALLY DISINTEGRATING ORAL EVERY 6 HOURS PRN
Qty: 20 TABLET | Refills: 0 | Status: ON HOLD | OUTPATIENT
Start: 2017-12-11 | End: 2020-08-18

## 2017-12-11 RX ORDER — ASPIRIN 81 MG/1
81 TABLET ORAL DAILY
Refills: 0 | COMMUNITY
Start: 2017-12-11

## 2017-12-11 RX ORDER — CIPROFLOXACIN 500 MG/1
500 TABLET ORAL EVERY 12 HOURS
Qty: 22 TABLET | Refills: 0 | Status: SHIPPED | OUTPATIENT
Start: 2017-12-11 | End: 2017-12-22

## 2017-12-11 RX ORDER — METRONIDAZOLE 500 MG/1
500 TABLET ORAL EVERY 8 HOURS
Qty: 33 TABLET | Refills: 0 | Status: SHIPPED | OUTPATIENT
Start: 2017-12-11 | End: 2017-12-22

## 2017-12-11 RX ADMIN — STANDARDIZED SENNA CONCENTRATE AND DOCUSATE SODIUM 2 TABLET: 8.6; 5 TABLET, FILM COATED ORAL at 10:12

## 2017-12-11 RX ADMIN — PILOCARPINE HYDROCHLORIDE 10 MG: 5 TABLET, FILM COATED ORAL at 06:12

## 2017-12-11 RX ADMIN — METRONIDAZOLE 500 MG: 500 TABLET ORAL at 05:12

## 2017-12-11 RX ADMIN — CIPROFLOXACIN HYDROCHLORIDE 500 MG: 500 TABLET, FILM COATED ORAL at 10:12

## 2017-12-11 RX ADMIN — Medication 0.12 MG: at 06:12

## 2017-12-11 RX ADMIN — TRAMADOL HYDROCHLORIDE 50 MG: 50 TABLET, COATED ORAL at 12:12

## 2017-12-11 RX ADMIN — URSODIOL 750 MG: 250 TABLET, FILM COATED ORAL at 06:12

## 2017-12-11 RX ADMIN — BISOPROLOL FUMARATE 5 MG: 5 TABLET, COATED ORAL at 10:12

## 2017-12-11 RX ADMIN — ASPIRIN 81 MG: 81 TABLET, COATED ORAL at 10:12

## 2017-12-11 RX ADMIN — HYDROXYCHLOROQUINE SULFATE 200 MG: 200 TABLET, FILM COATED ORAL at 10:12

## 2017-12-11 RX ADMIN — FLUOXETINE 20 MG: 20 CAPSULE ORAL at 10:12

## 2017-12-11 RX ADMIN — FAMOTIDINE 20 MG: 20 TABLET, FILM COATED ORAL at 10:12

## 2017-12-11 RX ADMIN — ONDANSETRON 4 MG: 2 INJECTION INTRAMUSCULAR; INTRAVENOUS at 07:12

## 2017-12-11 RX ADMIN — SODIUM CHLORIDE, SODIUM LACTATE, POTASSIUM CHLORIDE, AND CALCIUM CHLORIDE 500 ML: 600; 310; 30; 20 INJECTION, SOLUTION INTRAVENOUS at 11:12

## 2017-12-11 RX ADMIN — Medication 10 MG: at 10:12

## 2017-12-11 RX ADMIN — POLYETHYLENE GLYCOL 3350 17 G: 17 POWDER, FOR SOLUTION ORAL at 10:12

## 2017-12-11 NOTE — PLAN OF CARE
"[facility]    HOME HEALTH ORDERS  FACE TO FACE ENCOUNTER    Patient Name: Justa Acosta  YOB: 1940    PCP: Yordy Collier MD   PCP Address: [address] [place] [address] [address] / [place] [address]  PCP Phone Number: [phone]  PCP Fax: [phone]    Encounter Date: 12/11/2017    Admit to Home Health    Diagnoses:  Active Hospital Problems    Diagnosis  POA    *Transaminitis [R74.0]  Yes    Pancreatitis [K85.90]  Yes     Priority: 1 - High    Hypophosphatemia [E83.39]  Yes    Chronic kidney disease, stage III (moderate) [N18.3]  Yes    Abnormal liver enzymes [R74.8]  Yes    DM (diabetes mellitus) [E11.9]  Yes    Vascular dementia [F01.50]  Yes    HTN (hypertension) [I10]  Yes    Fever [R50.9]  Yes      Resolved Hospital Problems    Diagnosis Date Resolved POA   No resolved problems to display.       No future appointments.        I have seen and examined this patient face to face today. My clinical findings that support the need for the home health skilled services and home bound status are the following:  Weakness/numbness causing balance and gait disturbance due to Weakness/Debility making it taxing to leave home.  Requiring assistive device to leave home due to unsteady gait caused by  Weakness/Debility.    Allergies:  Review of patient's allergies indicates:   Allergen Reactions    Hydromorphone Itching, Nausea And Vomiting and Other (See Comments)     Insomnia, not controlled by Benadryl    Adhesive Other (See Comments)     blister    Celebrex [celecoxib] Other (See Comments)     Burns face "like sunburn"    Celexa [citalopram] Other (See Comments)     Sweating, anxiety    Codeine Itching    Colchicine analogues Other (See Comments)     Raises blood pressure    Cymbalta [duloxetine] Itching and Other (See Comments)     Diarrhea, insomnia    Darvocet a500 [propoxyphene n-acetaminophen] Itching and Nausea And Vomiting    Effexor [venlafaxine] Nausea And " "Vomiting and Other (See Comments)     Headache, depression, insomnia    Felodipine Other (See Comments)     Swelling of extremities    Fentanyl Itching and Nausea And Vomiting    Gabapentin Itching and Other (See Comments)     Insomnia and severe joint pain    Hydrochlorothiazide      For Dr Olivas notes    Hydrocodone-acetaminophen Itching     Uncontrolled with Benadryl    Lasix [furosemide] Itching and Other (See Comments)     insomnia    Lexapro [escitalopram] Itching, Nausea And Vomiting and Other (See Comments)     "mind races", insomnia    Nubain [nalbuphine] Nausea And Vomiting    Persantine [dipyridamole] Nausea And Vomiting    Prozac [fluoxetine] Other (See Comments)     Insomnia, depression    Relafen [nabumetone] Nausea Only    Spironolactone Itching, Nausea And Vomiting and Other (See Comments)     insomnia    Tramadol Itching and Other (See Comments)     insomnia    Triamterene-hydrochlorothiazid Itching    Zoloft [sertraline] Itching and Other (See Comments)     "Blood vessels break in legs"       Diet: regular diet    Activities: activity as tolerated    Nursing:   SN to complete comprehensive assessment including routine vital signs. Instruct on disease process and s/s of complications to report to MD. Review/verify medication list sent home with the patient at time of discharge  and instruct patient/caregiver as needed. Frequency may be adjusted depending on start of care date.    Notify MD if SBP > 160 or < 90; DBP > 90 or < 50; HR > 120 or < 50; Temp > 101;       CONSULTS:    Physical Therapy to evaluate and treat. Evaluate for home safety and equipment needs; Establish/upgrade home exercise program. Perform / instruct on therapeutic exercises, gait training, transfer training, and Range of Motion.  Occupational Therapy to evaluate and treat. Evaluate home environment for safety and equipment needs. Perform/Instruct on transfers, ADL training, ROM, and therapeutic exercises.  Aide " to provide assistance with personal care, ADLs, and vital signs.    MISCELLANEOUS CARE:  Home Oxygen:  Oxygen at 2 L/min nasal canula to be used:  Continuously.    WOUND CARE ORDERS  n/a      Medications: Review discharge medications with patient and family and provide education.      Current Discharge Medication List      START taking these medications    Details   ciprofloxacin HCl (CIPRO) 500 MG tablet  12/22/17: Last dose   Take 1 tablet (500 mg total) by mouth every 12 (twelve) hours.  Qty: 22 tablet, Refills: 0      metroNIDAZOLE (FLAGYL) 500 MG tablet  12/22/17: Last dose Take 1 tablet (500 mg total) by mouth every 8 (eight) hours.  Qty: 33 tablet, Refills: 0      ondansetron (ZOFRAN-ODT) 8 MG TbDL Take 1 tablet (8 mg total) by mouth every 6 (six) hours as needed.  Qty: 20 tablet, Refills: 0         CONTINUE these medications which have CHANGED    Details   aspirin (ECOTRIN) 81 MG EC tablet Take 1 tablet (81 mg total) by mouth once daily. Avoid aspirin and nonsteroidal anti-inflammatory medicines for 2 weeks.  Refills: 0         CONTINUE these medications which have NOT CHANGED    Details   !! ALPRAZolam (XANAX) 0.25 MG tablet Take 0.125 mg by mouth every morning.      amitriptyline (ELAVIL) 10 MG tablet Take 10 mg by mouth every evening.      bisoprolol (ZEBETA) 5 MG tablet Take 5 mg by mouth once daily.      donepezil (ARICEPT) 10 MG tablet Take 10 mg by mouth every evening.      FLUoxetine (PROZAC) 20 MG capsule Take 20 mg by mouth once daily.      glimepiride (AMARYL) 1 MG tablet Take 1 mg by mouth every evening.      hydroxychloroquine (PLAQUENIL) 200 mg tablet Take 200 mg by mouth 2 (two) times daily.      irbesartan (AVAPRO) 300 MG tablet Take 300 mg by mouth every evening.      mirabegron 50 mg Tb24 Take by mouth.      !! pilocarpine (SALAGEN) 5 MG Tab Take 10 mg by mouth every morning.      !! pilocarpine (SALAGEN) 5 MG Tab Take 5 mg by mouth every evening.      ranitidine (ZANTAC) 300 MG tablet Take  300 mg by mouth every evening.      solifenacin (VESICARE) 10 MG tablet Take by mouth once daily.      ursodiol (ACTIGALL) 500 MG tablet Take 750 mg by mouth every evening.      !! alprazolam (XANAX) 0.25 MG tablet Take 0.25 mg by mouth every evening. Takes 2 tabs every night      amlodipine (NORVASC) 10 MG tablet Take 10 mg by mouth every evening.       memantine (NAMENDA XR) 28 mg CSpX Take by mouth Daily.      MV-MIN/FA/D3/OM-3/DHA/EPA/FISH (CARDIAMIN ORAL) Take by mouth.      oxymetazoline (AFRIN) 0.05 % nasal spray 2 sprays by Nasal route 2 (two) times daily.      sodium chloride 0.9% 0.9 % SolP with fentaNYL 50 mcg/mL Soln 2 mcg/mL, bupivacaine 0.5% (5 mg/ml) 0.5 % (5 mg/mL) Soln 0.125 % by Epidural route continuous. Morphine added      torsemide (DEMADEX) 10 MG Tab Take 10 mg by mouth every morning.        !! - Potential duplicate medications found. Please discuss with provider.          I certify that this patient is confined to her home and needs intermittent skilled nursing care, physical therapy and occupational therapy.

## 2017-12-11 NOTE — PLAN OF CARE
Plans for patient to discharge home today with [facility]. SW and CM will continue to follow for any additional needs. Discharge and follow-up instructions to be completed by the bedside nurse.     12/11/17 4350   Final Note   Assessment Type Final Discharge Note   Discharge Disposition Home-Health  ([facility])   What phone number can be called within the next 1-3 days to see how you are doing after discharge? ([phone])   Hospital Follow Up  Appt(s) scheduled? Yes   Discharge plans and expectations educations in teach back method with documentation complete? Yes

## 2017-12-11 NOTE — NURSING
PIV removed from Right FA, wheelchair provided and PCT wheeled patient to personal car. No questions re: d/c, all instructions reviewed.

## 2017-12-11 NOTE — ASSESSMENT & PLAN NOTE
post-ERCP pancreatitis  mildy elevated lipase + epigastric pain     - LR bolus 500cc  - patient is tolerating liquids but feels nauseated no episodes of vomiting  - epigastric pain mildy improved from yesterday  - will attempt to advance diet     - will re-evaluate in the afternoon; possibly discharge later today

## 2017-12-11 NOTE — ASSESSMENT & PLAN NOTE
- Improved from OSH records prior to admission  - , ; ratio   - Per patient and daughter pt w/ viral hepatitis (unsure of which virus), state this was contracted from blood transfusion she received years ago, states doctor at that time passed away and pt does not have record. Acute hep panel is negative here  - Received EUS and ERCP on 12/9. entire main bile duct was severely dilated (21 mm). No stones noted. Sludge in CBD. Received 1 pancreatic stent and 1 CBD stent.    - s/p stent placement day 3  - AST and ALT downtrending

## 2017-12-11 NOTE — PLAN OF CARE
Problem: Patient Care Overview  Goal: Plan of Care Review  Outcome: Ongoing (interventions implemented as appropriate)  Pt AAO x 4 & ambulatory throughout night. Pt remains free from falls. Pt up to bathroom w/ assist x 1. Yellow socks & fall risk band on. Pts HS . Pt given 1 time dose of Tramadol for back pain. Pain relieved. Pt w/out any other complaints or concerns.  remains at bedside.

## 2017-12-11 NOTE — PLAN OF CARE
Problem: Patient Care Overview  Goal: Plan of Care Review  Outcome: Outcome(s) achieved Date Met: 12/11/17  Patient's VSS and in NAD for shift. Patient remains AAOx4, with intermittent bouts of confusion. Up with one assist. Reviewed discharge instructions with patient and spouse and both voiced understanding. Will discharge to home.

## 2017-12-11 NOTE — SUBJECTIVE & OBJECTIVE
Interval History: Overnight, patient reported epigastric abd pain and nausea; no episodes of vomiting. Has not had a BM for the last 2 days. Patient feels like she is not ready to go home; but is willing to wait to be re-evaluated later this afternoon. Denies fevers, chills, CP, SOB, palpations, vomiting.     S/p EUS and ERCP day 3, biliary and pancreatic stents placed.  Patient complaining of continuous epigastric pain this AM, most likely secondary to iatrogenic pancreatitis.       Review of Systems   Constitutional: Negative for appetite change, chills and fever.   Eyes: Negative for photophobia.   Respiratory: Negative for cough and shortness of breath.    Cardiovascular: Negative for chest pain.   Gastrointestinal: Positive for abdominal pain and nausea.   Genitourinary: Negative for dysuria.   Musculoskeletal: Positive for back pain, joint swelling (R ankle) and neck pain.   Skin: Negative for rash.   Neurological: Negative for speech difficulty and headaches.   Psychiatric/Behavioral: Negative for agitation and confusion.     Objective:     Vital Signs (Most Recent):  Temp: 98.1 °F (36.7 °C) (12/11/17 0738)  Pulse: (!) 59 (12/11/17 0738)  Resp: 16 (12/11/17 0738)  BP: (!) 128/58 (12/11/17 0738)  SpO2: 98 % (12/11/17 0738) Vital Signs (24h Range):  Temp:  [97.4 °F (36.3 °C)-98.5 °F (36.9 °C)] 98.1 °F (36.7 °C)  Pulse:  [50-62] 59  Resp:  [16-18] 16  SpO2:  [93 %-98 %] 98 %  BP: (106-148)/(53-66) 128/58     Weight: 82.6 kg (182 lb)  Body mass index is 33.28 kg/m².    Intake/Output Summary (Last 24 hours) at 12/11/17 0918  Last data filed at 12/11/17 0500   Gross per 24 hour   Intake          1483.75 ml   Output                0 ml   Net          1483.75 ml      Physical Exam   Constitutional: She is oriented to person, place, and time. She appears well-developed and well-nourished. No distress.   HENT:   Head: Normocephalic and atraumatic.   Eyes: Conjunctivae and EOM are normal. No scleral icterus.   Neck:  Normal range of motion. Neck supple.   Cardiovascular: Normal rate, regular rhythm and intact distal pulses.    No murmur heard.  Pulmonary/Chest: Breath sounds normal. No respiratory distress.   On 3 L NC   Abdominal: Soft. She exhibits no distension. There is tenderness (epigastric). There is no rebound and no guarding.   Open cholecystectomy scar  Striae on abdomen   Musculoskeletal: She exhibits edema (R ankle).   R leg with vertical surgical incision s/p hardware   Neurological: She is alert and oriented to person, place, and time.   Skin: No rash noted.   Psychiatric: She has a normal mood and affect.       Significant Labs: All pertinent labs within the past 24 hours have been reviewed.    Significant Imaging: I have reviewed all pertinent imaging results/findings within the past 24 hours.

## 2017-12-11 NOTE — PROGRESS NOTES
Pt c/o pain not relieved by Tylenol. Dr Baker notified & Tramadol ordered. Pt stated Tramadol  makes her itch, but she rather itch than hurt right now. Dr Baker also ordered to D/C IVF at this time.

## 2017-12-11 NOTE — PROGRESS NOTES
"[facility] Hospital Medicine  Progress Note    Patient Name: uJsta Acosta  MRN: [id number]  Patient Class: IP- Inpatient   Admission Date: 12/7/2017  Length of Stay: 4 days  Attending Physician: Celine Tsang MD  Primary Care Provider: Yordy Collier MD    [facility] Medicine Team: [facility] HOSP MED 5 Jyoti Cruz MD    Subjective:     Principal Problem:Transaminitis    HPI:  76 yo F PMhx vascular dementia, CAD, PAD s/p aortic and b/l femoral stenting, KELI, Sjrogren's, COPD on 3L home O2, HTN, DM and viral hepatitis (per patient s/p blood transfusion years ago but unsure which virus), PShx cholecystectomy who presented s/p transfer with fever, nausea, disorientation, and "whole body shaking." Per daughter home health nurse noted patient's saturation 70s on home 3L which corrected when increased to 5L, however patient also had the above symptoms therefore daughter brought patient to ED.    Patient had initially been seen by her GI physician for some time for elevated liver enzymes. She was admitted to [facility] once in April and again on Oct 31st where she presented with fever, diorientation, nausea, and further elevation of liver enzymes as high as 400s. Patient was seen by ID who was concerned for biliary source given imaging which had shown CBD dilation. During her most recent admission in late October-November patient was bacteremic with K. pneumo. Per transfer note sensitivies (S: cipro, ertapenem, Imipinem, Gent, Zosyn and augmentin) and (R: Cefepime, Rocephin, Ancef, Amp). She was discharged home on PO antibiotics just like in April, patient and daughter unsure of which antibiotic. Patient was scheduled for oupatient EUS/ERCP at [facility] on 12/18 for further evaluation. Per daughter each time patient presents with similar symptoms as outlined above she is found to be septic. In OSH ED patient was febrile s/p meropenem x 1, cirpo IV x 1, as well tylenol and her fever subsequently " Defervesced.    Per transfer note the case was discussed with Dr. Hsu from the transfer center, who agreed to assist in the care of the patient.    Hospital Course:  No notes on file    Interval History: Overnight, patient reported epigastric abd pain and nausea; no episodes of vomiting. Has not had a BM for the last 2 days. Patient feels like she is not ready to go home; but is willing to wait to be re-evaluated later this afternoon. Denies fevers, chills, CP, SOB, palpations, vomiting.     S/p EUS and ERCP day 3, biliary and pancreatic stents placed.  Patient complaining of continuous epigastric pain this AM, most likely secondary to iatrogenic pancreatitis.       Review of Systems   Constitutional: Negative for appetite change, chills and fever.   Eyes: Negative for photophobia.   Respiratory: Negative for cough and shortness of breath.    Cardiovascular: Negative for chest pain.   Gastrointestinal: Positive for abdominal pain and nausea.   Genitourinary: Negative for dysuria.   Musculoskeletal: Positive for back pain, joint swelling (R ankle) and neck pain.   Skin: Negative for rash.   Neurological: Negative for speech difficulty and headaches.   Psychiatric/Behavioral: Negative for agitation and confusion.     Objective:     Vital Signs (Most Recent):  Temp: 98.1 °F (36.7 °C) (12/11/17 0738)  Pulse: (!) 59 (12/11/17 0738)  Resp: 16 (12/11/17 0738)  BP: (!) 128/58 (12/11/17 0738)  SpO2: 98 % (12/11/17 0738) Vital Signs (24h Range):  Temp:  [97.4 °F (36.3 °C)-98.5 °F (36.9 °C)] 98.1 °F (36.7 °C)  Pulse:  [50-62] 59  Resp:  [16-18] 16  SpO2:  [93 %-98 %] 98 %  BP: (106-148)/(53-66) 128/58     Weight: 82.6 kg (182 lb)  Body mass index is 33.28 kg/m².    Intake/Output Summary (Last 24 hours) at 12/11/17 0918  Last data filed at 12/11/17 0500   Gross per 24 hour   Intake          1483.75 ml   Output                0 ml   Net          1483.75 ml      Physical Exam   Constitutional: She is oriented to person, place,  and time. She appears well-developed and well-nourished. No distress.   HENT:   Head: Normocephalic and atraumatic.   Eyes: Conjunctivae and EOM are normal. No scleral icterus.   Neck: Normal range of motion. Neck supple.   Cardiovascular: Normal rate, regular rhythm and intact distal pulses.    No murmur heard.  Pulmonary/Chest: Breath sounds normal. No respiratory distress.   On 3 L NC   Abdominal: Soft. She exhibits no distension. There is tenderness (epigastric). There is no rebound and no guarding.   Open cholecystectomy scar  Striae on abdomen   Musculoskeletal: She exhibits edema (R ankle).   R leg with vertical surgical incision s/p hardware   Neurological: She is alert and oriented to person, place, and time.   Skin: No rash noted.   Psychiatric: She has a normal mood and affect.       Significant Labs: All pertinent labs within the past 24 hours have been reviewed.    Significant Imaging: I have reviewed all pertinent imaging results/findings within the past 24 hours.    Assessment/Plan:      * Transaminitis    - Improved from OSH records prior to admission  - , ; ratio   - Per patient and daughter pt w/ viral hepatitis (unsure of which virus), state this was contracted from blood transfusion she received years ago, states doctor at that time passed away and pt does not have record. Acute hep panel is negative here  - Received EUS and ERCP on 12/9. entire main bile duct was severely dilated (21 mm). No stones noted. Sludge in CBD. Received 1 pancreatic stent and 1 CBD stent.    - s/p stent placement day 3  - AST and ALT downtrending           Pancreatitis    post-ERCP pancreatitis  mildy elevated lipase + epigastric pain     - LR bolus 500cc  - patient is tolerating liquids but feels nauseated no episodes of vomiting  - epigastric pain mildy improved from yesterday  - will attempt to advance diet     - will re-evaluate in the afternoon; possibly discharge later today           HTN  (hypertension)    Patient takes bisoprolol, irbesartan, and norvasc at home  Restart medications as appropriate        Vascular dementia    C/w home aricept, namenda          DM (diabetes mellitus)    Patient takes glimeperide at home  Holding home glimeperide  Started on aspart low dose sliding scale          Fever    - Patient with multiple admission to [facility] for sepsis, bacteremia on last admission in Oct-Nov w/ Klebsiella. Currently no leukocytosis  - S/p meropenem x 1, cipro x 1 at OSH. Received meropenem x 1 here  - Concern for biliary source. S/p EUS and ERCP, 1 pancreatic stent and 1 biliary stent placed  - Blood cultures NGTD  - Continue cipro and flagyl for gram negative and anearobic coverage. ID consult in place  - Will check OSH records for any growth prior to transfer            VTE Risk Mitigation         Ordered     enoxaparin injection 40 mg  Daily     Route:  Subcutaneous        12/08/17 6517              Jyoti Curz MD  Department of Hospital Medicine   [facility]

## 2017-12-12 NOTE — DISCHARGE SUMMARY
"[facility] Hospital Medicine  Discharge Summary      Patient Name: Justa Acosta  MRN: [id number]  Admission Date: 12/7/2017  Hospital Length of Stay: 4 days  Discharge Date and Time:  12/12/2017 2:51 PM  Attending Physician: No att. providers found   Discharging Provider: Jyoti Cruz MD  Primary Care Provider: Yordy Collier MD  [facility] Medicine Team: [facility] HOSP MED 5 Jyoti Crzu MD    HPI:   78 yo F PMhx vascular dementia, CAD, PAD s/p aortic and b/l femoral stenting, KELI, Sjrogren's, COPD on 3L home O2, HTN, DM and viral hepatitis (per patient s/p blood transfusion years ago but unsure which virus), PShx cholecystectomy who presented s/p transfer with fever, nausea, disorientation, and "whole body shaking." Per daughter home health nurse noted patient's saturation 70s on home 3L which corrected when increased to 5L, however patient also had the above symptoms therefore daughter brought patient to ED.    Patient had initially been seen by her GI physician for some time for elevated liver enzymes. She was admitted to [facility] once in April and again on Oct 31st where she presented with fever, diorientation, nausea, and further elevation of liver enzymes as high as 400s. Patient was seen by ID who was concerned for biliary source given imaging which had shown CBD dilation. During her most recent admission in late October-November patient was bacteremic with K. pneumo. Per transfer note sensitivies (S: cipro, ertapenem, Imipinem, Gent, Zosyn and augmentin) and (R: Cefepime, Rocephin, Ancef, Amp). She was discharged home on PO antibiotics just like in April, patient and daughter unsure of which antibiotic. Patient was scheduled for oupatient EUS/ERCP at [facility] on 12/18 for further evaluation. Per daughter each time patient presents with similar symptoms as outlined above she is found to be septic. In OSH ED patient was febrile s/p meropenem x 1, cirpo IV x 1, as well " tylenol and her fever subsequently Defervesced.    Per transfer note the case was discussed with Dr. Hsu from the transfer center, who agreed to assist in the care of the patient.    Procedure(s) (LRB):  ERCP (N/A)  ULTRASOUND-ENDOSCOPIC-UPPER (N/A)      Hospital Course:   77 y.o F patient w/ a long history of an unknown biliary disease.  She has had a cholecystectomy in the remote past.  Despite this, over the past year she has been admitted at [facility] multiple times with acute elevations and GNR sepsis. Initially the plan was for an outpatient EUS/ERCP scheduled on 12/18/17; however today she became slightly confused and lethargic, which is apparently how she behaves during the development of her episodes of biliary sepsis. Presented to [facility] ED from there the decision was made to transfer the patient to [facility] for EUR/ERCP.     Pt had ERCP performed which showed main bile duct to be dilated, no obstructions seen. Pancreatic and biliary stent placed. S/p stent placement she had a mild elevation in her lipase and there was concern for pancreatitis; she was given IV fluids, pain was controlled and she was tolerating PO diet well.     Additionally, pt remained afebrile, stable, nonseptic appearing, without a leukocytosis. Blood cultures here have remain negative to date. procalcitonin negative. Patient was d/c on cipro & flagyl * 14 days (total). Follow-up with GI in 4 weeks to have repeat ERCP for stents removal.      Consults:   Consults         Status Ordering Provider     Inpatient consult to [facility]  Once     Provider:  (Not yet assigned)    Completed COURTNEY LUONG     Inpatient consult to Infectious Diseases  Once     Provider:  (Not yet assigned)    Completed COURTNEY LUONG          No new Assessment & Plan notes have been filed under this hospital service since the last note was generated.  Service: Hospital Medicine    Final Active Diagnoses:    Diagnosis Date  Noted POA    PRINCIPAL PROBLEM:  Transaminitis [R74.0] 12/08/2017 Yes    Pancreatitis [K85.90] 12/09/2017 Yes    Hypophosphatemia [E83.39] 12/10/2017 Yes    Chronic kidney disease, stage III (moderate) [N18.3] 12/09/2017 Yes    Abnormal liver enzymes [R74.8] 12/09/2017 Yes    DM (diabetes mellitus) [E11.9] 12/08/2017 Yes    Vascular dementia [F01.50] 12/08/2017 Yes    HTN (hypertension) [I10] 12/08/2017 Yes    Fever [R50.9] 12/07/2017 Yes      Problems Resolved During this Admission:    Diagnosis Date Noted Date Resolved POA       Discharged Condition: good    Disposition: Home or Self Care    Follow Up:    Patient Instructions:     Ambulatory Referral to Gastroenterology   Referral Priority: Routine Referral Type: Consultation   Referral Reason: Specialty Services Required    Requested Specialty: Gastroenterology    Number of Visits Requested: 1      Diet general   Scheduling Instructions: Increase diet as tolerated.     Call MD for:  temperature >100.4     Call MD for:  persistent nausea and vomiting or diarrhea     Call MD for:  severe uncontrolled pain     Call MD for:  redness, tenderness, or signs of infection (pain, swelling, redness, odor or green/yellow discharge around incision site)     Call MD for:  difficulty breathing or increased cough     Call MD for:  severe persistent headache     Call MD for:  persistent dizziness, light-headedness, or visual disturbances         Significant Diagnostic Studies: Labs:   CMP     Recent Labs  Lab 12/11/17  0601      K 4.0      CO2 32*   *   BUN 12   CREATININE 0.8   CALCIUM 9.2   PROT 6.6   ALBUMIN 2.9*   BILITOT 0.5   ALKPHOS 96   AST 19   ALT 62*   ANIONGAP 6*   ESTGFRAFRICA >60.0   EGFRNONAA >60.0    and CBC     Recent Labs  Lab 12/11/17  0601   WBC 7.93   HGB 11.7*   HCT 35.7*   *       Pending Diagnostic Studies:     Procedure Component Value Units Date/Time    FL ERCP Biliary And Pancreatic [[id number]] Resulted:  12/08/17 1200     Order Status:  Sent Lab Status:  In process Updated:  12/08/17 1200    US Endoscopic Ultrasound [[id number]] Resulted:  12/08/17 1200    Order Status:  Sent Lab Status:  In process Updated:  12/08/17 1200         Medications:  Reconciled Home Medications:   Discharge Medication List as of 12/11/2017  3:08 PM      START taking these medications    Details   ciprofloxacin HCl (CIPRO) 500 MG tablet Take 1 tablet (500 mg total) by mouth every 12 (twelve) hours., Starting Mon 12/11/2017, Until Fri 12/22/2017, Normal      metroNIDAZOLE (FLAGYL) 500 MG tablet Take 1 tablet (500 mg total) by mouth every 8 (eight) hours., Starting Mon 12/11/2017, Until Fri 12/22/2017, Normal      ondansetron (ZOFRAN-ODT) 8 MG TbDL Take 1 tablet (8 mg total) by mouth every 6 (six) hours as needed., Starting Mon 12/11/2017, Normal         CONTINUE these medications which have CHANGED    Details   aspirin (ECOTRIN) 81 MG EC tablet Take 1 tablet (81 mg total) by mouth once daily. Avoid aspirin and nonsteroidal anti-inflammatory medicines for 2 weeks., Starting Mon 12/11/2017, OTC         CONTINUE these medications which have NOT CHANGED    Details   !! ALPRAZolam (XANAX) 0.25 MG tablet Take 0.125 mg by mouth every morning., Historical Med      amitriptyline (ELAVIL) 10 MG tablet Take 10 mg by mouth every evening., Historical Med      bisoprolol (ZEBETA) 5 MG tablet Take 5 mg by mouth once daily., Historical Med      donepezil (ARICEPT) 10 MG tablet Take 10 mg by mouth every evening., Historical Med      FLUoxetine (PROZAC) 20 MG capsule Take 20 mg by mouth once daily., Historical Med      glimepiride (AMARYL) 1 MG tablet Take 1 mg by mouth every evening., Historical Med      hydroxychloroquine (PLAQUENIL) 200 mg tablet Take 200 mg by mouth 2 (two) times daily., Historical Med      irbesartan (AVAPRO) 300 MG tablet Take 300 mg by mouth every evening., Historical Med      mirabegron 50 mg Tb24 Take by mouth., Historical Med      !! pilocarpine  (SALAGEN) 5 MG Tab Take 10 mg by mouth every morning., Historical Med      !! pilocarpine (SALAGEN) 5 MG Tab Take 5 mg by mouth every evening., Historical Med      ranitidine (ZANTAC) 300 MG tablet Take 300 mg by mouth every evening., Historical Med      solifenacin (VESICARE) 10 MG tablet Take by mouth once daily., Historical Med      ursodiol (ACTIGALL) 500 MG tablet Take 750 mg by mouth every evening., Historical Med      !! alprazolam (XANAX) 0.25 MG tablet Take 0.25 mg by mouth every evening. Takes 2 tabs every night, Until Discontinued, Historical Med      amlodipine (NORVASC) 10 MG tablet Take 10 mg by mouth every evening. , Historical Med      memantine (NAMENDA XR) 28 mg CSpX Take by mouth Daily., Until Discontinued, Historical Med      MV-MIN/FA/D3/OM-3/DHA/EPA/FISH (CARDIAMIN ORAL) Take by mouth., Until Discontinued, Historical Med      oxymetazoline (AFRIN) 0.05 % nasal spray 2 sprays by Nasal route 2 (two) times daily., Until Discontinued, Historical Med      sodium chloride 0.9% 0.9 % SolP with fentaNYL 50 mcg/mL Soln 2 mcg/mL, bupivacaine 0.5% (5 mg/ml) 0.5 % (5 mg/mL) Soln 0.125 % by Epidural route continuous. Morphine added, Until Discontinued, Historical Med      torsemide (DEMADEX) 10 MG Tab Take 10 mg by mouth every morning. , Until Discontinued, Historical Med       !! - Potential duplicate medications found. Please discuss with provider.          Indwelling Lines/Drains at time of discharge:   Lines/Drains/Airways     Epidural Line                 Perineural Analgesia/Anesthesia Assessment (using dermatomes) Epidural 02/20/13 0645 1756 days                Time spent on the discharge of patient: 30 minutes  Patient was seen and examined on the date of discharge and determined to be suitable for discharge.         Jyoti Cruz MD  Department of Hospital Medicine  [facility]

## 2017-12-12 NOTE — HOSPITAL COURSE
77 y.o F patient w/ a long history of an unknown biliary disease.  She has had a cholecystectomy in the remote past.  Despite this, over the past year she has been admitted at [facility] multiple times with acute elevations and GNR sepsis. Initially the plan was for an outpatient EUS/ERCP scheduled on 12/18/17; however today she became slightly confused and lethargic, which is apparently how she behaves during the development of her episodes of biliary sepsis. Presented to [facility] ED from there the decision was made to transfer the patient to [facility] for EUR/ERCP.     Pt had ERCP performed which showed main bile duct to be dilated, no obstructions seen. Pancreatic and biliary stent placed. S/p stent placement she had a mild elevation in her lipase and there was concern for pancreatitis; she was given IV fluids, pain was controlled and she was tolerating PO diet well.     Additionally, pt remained afebrile, stable, nonseptic appearing, without a leukocytosis. Blood cultures here have remain negative to date. procalcitonin negative. Patient was d/c on cipro & flagyl * 14 days (total). Follow-up with GI in 4 weeks to have repeat ERCP for stents removal.

## 2017-12-13 ENCOUNTER — PATIENT OUTREACH (OUTPATIENT)
Dept: ADMINISTRATIVE | Facility: CLINIC | Age: 77
End: 2017-12-13

## 2017-12-13 LAB
BACTERIA BLD CULT: NORMAL
BACTERIA BLD CULT: NORMAL

## 2017-12-13 RX ORDER — LISINOPRIL 5 MG/1
5 TABLET ORAL DAILY
Status: ON HOLD | COMMUNITY
End: 2020-08-18

## 2017-12-13 NOTE — PATIENT INSTRUCTIONS
"  Vomiting (Adult)  Vomiting is a common symptom that may be due to different causes. These include gastroenteritis ("stomach flu"), food poisoning and gastritis. There are other more serious causes of vomiting which may be hard to diagnose early in the illness. Therefore, it is important to watch for the warning signs listed below.  The main danger from repeated vomiting is dehydration. This is due to excess loss of water and minerals from the body. When this occurs, body fluids must be replaced.  Home care  · If symptoms are severe, rest at home for the next 24 hours.  · Because your symptoms may be from an infection, wash your hands frequently and well, and use alcohol-based  to avoid spreading the infection to others.  · Wash your hands for at least 20 seconds. Hum the happy birthday song twice for the correct length of time.  · Wash your hands after using the toilet, before and after preparing food, before eating food, after changing a diaper, cleaning a wound, caring for a sick person, and blowing your nose, coughing, or sneezing. You should also wash your hands after caring for someone who is sick, touching pet food, or treats, and touching an animal, or animal waste.  · You may use acetaminophen or NSAID medicines like ibuprofen or naproxen to control fever, unless another medicine was prescribed. If you have chronic liver or kidney disease or ever had a stomach ulcer or GI bleeding, talk with your doctor before using these medicines. Aspirin should never be used in anyone under 18 years of age who is ill with a fever. It may cause severe liver damage. Don't use NSAID medicines if you are already taking one for another condition (like arthritis) or are on aspirin (such as for heart disease, or after a stroke)  · Avoid tobacco and alcohol use, which may worsen your symptoms.  · If medicines for vomiting were prescribed, take as directed.  · Once vomiting stops, then follow these guidelines:  During " the first 12 to 24 hours follow the diet below:  · Fruit juices. Apple, grape juice, clear fruit drinks, and electrolyte replacement drinks.  · Beverages. Soft drinks without caffeine; mineral water (plain or flavored), decaffeinated tea and coffee.  · Soups. Clear broth, consommé and bouillon  · Desserts. Plain gelatin, popsicles and fruit juice bars. As you feel better, you may add 6-8 ounces of yogurt per day.  During the next 24 hours you may add the following to the above:  · Hot cereal, plain toast, bread, rolls, crackers  · Plain noodles, rice, mashed potatoes, chicken noodle or rice soup  · Unsweetened canned fruit (avoid pineapple), bananas  · Limit caffeine and chocolate. No spices or seasonings except salt.  During the next 24 hours:  Gradually resume a normal diet, as you feel better and your symptoms lessen.  Follow-up care  Follow up with your healthcare provider, or as advised.  When to seek medical advice  Call your healthcare provider right away if any of these occur:  · Constant right-sided lower abdominal pain or increasing general abdominal pain  · Continued vomiting (unable to keep liquids down) for 24 hours  · Frequent diarrhea (more than 5 times a day); blood (red or black color) or mucus in diarrhea  · Reduced urine output or extreme thirst  · Weakness, dizziness or fainting  · Unusually drowsy or confused  · Fever of 100.4°F (38°C) oral or higher, or as directed  · Yellow color of the eyes or skin  Date Last Reviewed: 11/16/2015 © 2000-2017 [employer]. [address] [place] [address], [place] [address]. All rights reserved. This information is not intended as a substitute for professional medical care. Always follow your healthcare professional's instructions.

## 2017-12-15 ENCOUNTER — TELEPHONE (OUTPATIENT)
Dept: GASTROENTEROLOGY | Facility: CLINIC | Age: 77
End: 2017-12-15

## 2017-12-15 NOTE — TELEPHONE ENCOUNTER
Spoke with patient's daughter, Dara. ERCP scheduled for 1/4/18 at . Reviewed prep instructions. Dara verbalized understanding.

## 2017-12-18 ENCOUNTER — TELEPHONE (OUTPATIENT)
Dept: ENDOSCOPY | Facility: HOSPITAL | Age: 77
End: 2017-12-18

## 2017-12-18 NOTE — TELEPHONE ENCOUNTER
Spoke with patient's  about instructions for ERCP scheduled 1/4/18 at 1300.  Instructions emailed.

## 2017-12-29 ENCOUNTER — TELEPHONE (OUTPATIENT)
Dept: GASTROENTEROLOGY | Facility: CLINIC | Age: 77
End: 2017-12-29

## 2018-01-04 ENCOUNTER — ANESTHESIA EVENT (OUTPATIENT)
Dept: ENDOSCOPY | Facility: HOSPITAL | Age: 78
End: 2018-01-04
Payer: MEDICARE

## 2018-01-04 ENCOUNTER — ANESTHESIA (OUTPATIENT)
Dept: ENDOSCOPY | Facility: HOSPITAL | Age: 78
End: 2018-01-04
Payer: MEDICARE

## 2018-01-04 ENCOUNTER — HOSPITAL ENCOUNTER (OUTPATIENT)
Facility: HOSPITAL | Age: 78
Discharge: HOME | End: 2018-01-04
Attending: INTERNAL MEDICINE | Admitting: INTERNAL MEDICINE
Payer: MEDICARE

## 2018-01-04 VITALS
TEMPERATURE: 98 F | SYSTOLIC BLOOD PRESSURE: 156 MMHG | RESPIRATION RATE: 16 BRPM | OXYGEN SATURATION: 100 % | BODY MASS INDEX: 31.54 KG/M2 | DIASTOLIC BLOOD PRESSURE: 59 MMHG | HEART RATE: 60 BPM | HEIGHT: 63 IN | WEIGHT: 178 LBS

## 2018-01-04 DIAGNOSIS — Z87.19 HISTORY OF ACUTE CHOLANGITIS: ICD-10-CM

## 2018-01-04 DIAGNOSIS — Z87.19 HISTORY OF CHOLANGITIS: Primary | ICD-10-CM

## 2018-01-04 LAB
POCT GLUCOSE: 154 MG/DL (ref 70–110)
POCT GLUCOSE: 156 MG/DL (ref 70–110)
POCT GLUCOSE: 215 MG/DL (ref 70–110)

## 2018-01-04 PROCEDURE — D9220A PRA ANESTHESIA: Mod: CRNA,,, | Performed by: NURSE ANESTHETIST, CERTIFIED REGISTERED

## 2018-01-04 PROCEDURE — D9220A PRA ANESTHESIA: Mod: ANES,,, | Performed by: ANESTHESIOLOGY

## 2018-01-04 PROCEDURE — 94761 N-INVAS EAR/PLS OXIMETRY MLT: CPT

## 2018-01-04 PROCEDURE — 27000221 HC OXYGEN, UP TO 24 HOURS

## 2018-01-04 PROCEDURE — 63600175 PHARM REV CODE 636 W HCPCS

## 2018-01-04 PROCEDURE — 43275 ERCP REMOVE FORGN BODY DUCT: CPT | Performed by: INTERNAL MEDICINE

## 2018-01-04 PROCEDURE — 27201089 HC SNARE, DISP (ANY): Performed by: INTERNAL MEDICINE

## 2018-01-04 PROCEDURE — 25000003 PHARM REV CODE 250: Performed by: INTERNAL MEDICINE

## 2018-01-04 PROCEDURE — 63600175 PHARM REV CODE 636 W HCPCS: Performed by: NURSE ANESTHETIST, CERTIFIED REGISTERED

## 2018-01-04 PROCEDURE — S0028 INJECTION, FAMOTIDINE, 20 MG: HCPCS | Performed by: NURSE ANESTHETIST, CERTIFIED REGISTERED

## 2018-01-04 PROCEDURE — 74328 X-RAY BILE DUCT ENDOSCOPY: CPT | Mod: 26,,, | Performed by: INTERNAL MEDICINE

## 2018-01-04 PROCEDURE — 82962 GLUCOSE BLOOD TEST: CPT | Mod: 91 | Performed by: INTERNAL MEDICINE

## 2018-01-04 PROCEDURE — 43264 ERCP REMOVE DUCT CALCULI: CPT | Mod: 51,,, | Performed by: INTERNAL MEDICINE

## 2018-01-04 PROCEDURE — 37000009 HC ANESTHESIA EA ADD 15 MINS: Performed by: INTERNAL MEDICINE

## 2018-01-04 PROCEDURE — 43275 ERCP REMOVE FORGN BODY DUCT: CPT | Mod: ,,, | Performed by: INTERNAL MEDICINE

## 2018-01-04 PROCEDURE — 74328 X-RAY BILE DUCT ENDOSCOPY: CPT | Performed by: INTERNAL MEDICINE

## 2018-01-04 PROCEDURE — 27202125 HC BALLOON, EXTRACTION (ANY): Performed by: INTERNAL MEDICINE

## 2018-01-04 PROCEDURE — 25000003 PHARM REV CODE 250

## 2018-01-04 PROCEDURE — 37000008 HC ANESTHESIA 1ST 15 MINUTES: Performed by: INTERNAL MEDICINE

## 2018-01-04 PROCEDURE — 82962 GLUCOSE BLOOD TEST: CPT | Performed by: INTERNAL MEDICINE

## 2018-01-04 PROCEDURE — 43264 ERCP REMOVE DUCT CALCULI: CPT | Performed by: INTERNAL MEDICINE

## 2018-01-04 PROCEDURE — 25000003 PHARM REV CODE 250: Performed by: NURSE ANESTHETIST, CERTIFIED REGISTERED

## 2018-01-04 RX ORDER — DIPHENHYDRAMINE HYDROCHLORIDE 50 MG/ML
INJECTION INTRAMUSCULAR; INTRAVENOUS
Status: COMPLETED
Start: 2018-01-04 | End: 2018-01-04

## 2018-01-04 RX ORDER — FAMOTIDINE 10 MG/ML
INJECTION INTRAVENOUS
Status: DISCONTINUED | OUTPATIENT
Start: 2018-01-04 | End: 2018-01-04

## 2018-01-04 RX ORDER — SODIUM CHLORIDE 9 MG/ML
INJECTION, SOLUTION INTRAVENOUS CONTINUOUS
Status: DISCONTINUED | OUTPATIENT
Start: 2018-01-04 | End: 2018-01-04 | Stop reason: HOSPADM

## 2018-01-04 RX ORDER — PROPOFOL 10 MG/ML
VIAL (ML) INTRAVENOUS
Status: DISCONTINUED | OUTPATIENT
Start: 2018-01-04 | End: 2018-01-04

## 2018-01-04 RX ORDER — INDOMETHACIN 50 MG/1
SUPPOSITORY RECTAL
Status: COMPLETED | OUTPATIENT
Start: 2018-01-04 | End: 2018-01-04

## 2018-01-04 RX ORDER — ONDANSETRON 2 MG/ML
4 INJECTION INTRAMUSCULAR; INTRAVENOUS ONCE AS NEEDED
Status: DISCONTINUED | OUTPATIENT
Start: 2018-01-04 | End: 2018-01-04 | Stop reason: HOSPADM

## 2018-01-04 RX ORDER — SODIUM CHLORIDE 0.9 % (FLUSH) 0.9 %
3 SYRINGE (ML) INJECTION
Status: DISCONTINUED | OUTPATIENT
Start: 2018-01-04 | End: 2018-01-04 | Stop reason: HOSPADM

## 2018-01-04 RX ORDER — ACETAMINOPHEN 325 MG/1
650 TABLET ORAL ONCE
Status: COMPLETED | OUTPATIENT
Start: 2018-01-04 | End: 2018-01-04

## 2018-01-04 RX ORDER — CIPROFLOXACIN 2 MG/ML
INJECTION, SOLUTION INTRAVENOUS
Status: DISCONTINUED | OUTPATIENT
Start: 2018-01-04 | End: 2018-01-04

## 2018-01-04 RX ORDER — ONDANSETRON 2 MG/ML
INJECTION INTRAMUSCULAR; INTRAVENOUS
Status: DISCONTINUED | OUTPATIENT
Start: 2018-01-04 | End: 2018-01-04

## 2018-01-04 RX ORDER — DEXTROMETHORPHAN HYDROBROMIDE, GUAIFENESIN 5; 100 MG/5ML; MG/5ML
650 LIQUID ORAL EVERY 8 HOURS
Status: ON HOLD | COMMUNITY
End: 2020-08-18

## 2018-01-04 RX ORDER — ACETAMINOPHEN 325 MG/1
TABLET ORAL
Status: COMPLETED
Start: 2018-01-04 | End: 2018-01-04

## 2018-01-04 RX ORDER — DIPHENHYDRAMINE HYDROCHLORIDE 50 MG/ML
25 INJECTION INTRAMUSCULAR; INTRAVENOUS ONCE
Status: COMPLETED | OUTPATIENT
Start: 2018-01-04 | End: 2018-01-04

## 2018-01-04 RX ORDER — LIDOCAINE HCL/PF 100 MG/5ML
SYRINGE (ML) INTRAVENOUS
Status: DISCONTINUED | OUTPATIENT
Start: 2018-01-04 | End: 2018-01-04

## 2018-01-04 RX ADMIN — PROPOFOL 100 MG: 10 INJECTION, EMULSION INTRAVENOUS at 02:01

## 2018-01-04 RX ADMIN — INDOMETHACIN 100 MG: 50 SUPPOSITORY RECTAL at 02:01

## 2018-01-04 RX ADMIN — PROPOFOL 50 MG: 10 INJECTION, EMULSION INTRAVENOUS at 02:01

## 2018-01-04 RX ADMIN — DIPHENHYDRAMINE HYDROCHLORIDE 25 MG: 50 INJECTION INTRAMUSCULAR; INTRAVENOUS at 03:01

## 2018-01-04 RX ADMIN — LIDOCAINE HYDROCHLORIDE 100 MG: 20 INJECTION, SOLUTION INTRAVENOUS at 01:01

## 2018-01-04 RX ADMIN — ONDANSETRON 4 MG: 2 INJECTION INTRAMUSCULAR; INTRAVENOUS at 02:01

## 2018-01-04 RX ADMIN — CIPROFLOXACIN 400 MG: 2 INJECTION, SOLUTION INTRAVENOUS at 02:01

## 2018-01-04 RX ADMIN — ACETAMINOPHEN 650 MG: 325 TABLET ORAL at 03:01

## 2018-01-04 RX ADMIN — SODIUM CHLORIDE: 0.9 INJECTION, SOLUTION INTRAVENOUS at 01:01

## 2018-01-04 RX ADMIN — FAMOTIDINE 20 MG: 10 INJECTION, SOLUTION INTRAVENOUS at 02:01

## 2018-01-04 RX ADMIN — PROPOFOL 100 MG: 10 INJECTION, EMULSION INTRAVENOUS at 01:01

## 2018-01-04 RX ADMIN — DIPHENHYDRAMINE HYDROCHLORIDE 25 MG: 50 INJECTION, SOLUTION INTRAMUSCULAR; INTRAVENOUS at 03:01

## 2018-01-04 NOTE — DISCHARGE SUMMARY
Discharge Summary/Instructions after an Endoscopic Procedure    Patient Name: Justa Acosta  Patient MRN: [id number]  Patient YOB: 1940 Thursday, January 04, 2018  Tray Hsu MD    RESTRICTIONS:  During your procedure today, you received medications for sedation.  These medications may affect your judgment, balance and coordination.  Therefore, for 24 hours, you have the following restrictions:     - DO NOT drive a car, operate machinery, make legal/financial decisions, sign important papers or drink alcohol.      ACTIVITY:  The following day: return to full activity including work, except no heavy lifting, straining or running for 3 days if polyps were removed.    DIET:  Eat and drink normally unless instructed otherwise.     TREATMENT FOR COMMON SIDE EFFECTS:  - Mild abdominal pain, belching, bloating or excessive gas: rest, eat lightly and use a heating pad.  - Sore Throat: treat with throat lozenges and/or gargle with warm salt water.    SYMPTOMS TO WATCH FOR AND REPORT TO YOUR PHYSICIAN:  1. Abdominal pain or bloating, other than gas cramps.  2. Chest pain.  3. Back pain.  4. Chills or fever occurring within 24 hours after the procedure.  5. Rectal bleeding, which would show as bright red, maroon, or black stools. (A tablespoon of blood from the rectum is not serious, especially if hemorrhoids are present.)  6. Vomiting.  7. Weakness or dizziness.  8. Because air was used during the procedure, expelling large amounts of air from your rectum or belching is normal.  9. If a bowel prep was taken, you may not have a bowel movement for 1-3 days.  This is normal.    GO DIRECTLY TO THE NEAREST EMERGENCY ROOM IF YOU HAVE ANY OF THE FOLLOWING:     Difficulty breathing  Chills and/or fever over 101 F   Persistent vomiting and/or vomiting blood   Severe abdominal pain   Severe chest pain   Black, tarry stools   Bleeding- more than one tablespoon   Any other symptom or condition that you may feel needs  urgent attention    Your doctor recommends these additional instructions:  If any biopsies were taken, your doctor s clinic will contact you in 1 to 2 weeks with any results.    You are being discharged to home.   Watch for symptoms of pancreatitis, bleeding, perforation and cholangitis.   Your clinical course will be observed.    For questions, problems or results please call your physician - Tray Hsu MD at Work:  [phone].    [facility], EMERGENCY ROOM PHONE NUMBER: [phone]    IF A COMPLICATION OR EMERGENCY SITUATION ARISES AND YOU ARE UNABLE TO REACH YOUR PHYSICIAN - GO DIRECTLY TO THE EMERGENCY ROOM.
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
## 2018-01-04 NOTE — PROVATION PATIENT INSTRUCTIONS
Discharge Summary/Instructions after an Endoscopic Procedure  Patient Name: Justa Acosta  Patient MRN: [id number]  Patient YOB: 1940 Thursday, January 04, 2018  Tray Hsu MD  RESTRICTIONS:  During your procedure today, you received medications for sedation.  These   medications may affect your judgment, balance and coordination.  Therefore,   for 24 hours, you have the following restrictions:   - DO NOT drive a car, operate machinery, make legal/financial decisions,   sign important papers or drink alcohol.    ACTIVITY:  The following day: return to full activity including work, except no heavy   lifting, straining or running for 3 days if polyps were removed.  DIET:  Eat and drink normally unless instructed otherwise.     TREATMENT FOR COMMON SIDE EFFECTS:  - Mild abdominal pain, belching, bloating or excessive gas: rest, eat   lightly and use a heating pad.  - Sore Throat: treat with throat lozenges and/or gargle with warm salt   water.  SYMPTOMS TO WATCH FOR AND REPORT TO YOUR PHYSICIAN:  1. Abdominal pain or bloating, other than gas cramps.  2. Chest pain.  3. Back pain.  4. Chills or fever occurring within 24 hours after the procedure.  5. Rectal bleeding, which would show as bright red, maroon, or black stools.   (A tablespoon of blood from the rectum is not serious, especially if   hemorrhoids are present.)  6. Vomiting.  7. Weakness or dizziness.  8. Because air was used during the procedure, expelling large amounts of air   from your rectum or belching is normal.  9. If a bowel prep was taken, you may not have a bowel movement for 1-3   days.  This is normal.  GO DIRECTLY TO THE NEAREST EMERGENCY ROOM IF YOU HAVE ANY OF THE FOLLOWING:      Difficulty breathing  Chills and/or fever over 101 F   Persistent vomiting and/or vomiting blood   Severe abdominal pain   Severe chest pain   Black, tarry stools   Bleeding- more than one tablespoon   Any other symptom or condition that you may feel  needs urgent attention  Your doctor recommends these additional instructions:  If any biopsies were taken, your doctor s clinic will contact you in 1 to 2   weeks with any results.  You are being discharged to home.   Watch for symptoms of pancreatitis, bleeding, perforation and cholangitis.   Your clinical course will be observed.  For questions, problems or results please call your physician - Tray Hsu MD at Work:  [phone].  [facility], EMERGENCY ROOM PHONE NUMBER: [phone]  IF A COMPLICATION OR EMERGENCY SITUATION ARISES AND YOU ARE UNABLE TO REACH   YOUR PHYSICIAN - GO DIRECTLY TO THE EMERGENCY ROOM.  Tray Hsu MD  1/4/2018 2:27:35 PM  This report has been verified and signed electronically.

## 2018-01-04 NOTE — ANESTHESIA PREPROCEDURE EVALUATION
01/04/2018  Justa Acosta is a 77 y.o., female.    Pre-op Assessment         Review of Systems  Anesthesia Hx:  Hx of Anesthetic complications (PONV)    Cardiovascular:   Hypertension Valvular problems/Murmurs, MVP CAD   PVD    Pulmonary:   Sleep Apnea Chronic respiratory failure   Renal/:   Chronic Renal Disease, CRI    Hepatic/GI:   GERD Hepatitis Pancreatitis   Musculoskeletal:   Arthritis     Endocrine:   Diabetes    Psych:   Psychiatric History (agoraphobia) anxiety          Physical Exam  General:  Well nourished, Obesity    Airway/Jaw/Neck:  Airway Findings: Mouth Opening: Normal Tongue: Normal  General Airway Assessment: Adult  Mallampati: II  Improves to I with phonation.  TM Distance: Normal, at least 6 cm      Dental:  Dental Findings: Edentulous   Chest/Lungs:  Chest/Lungs Findings: Clear to auscultation     Heart/Vascular:  Heart Findings: Rate: Normal  Rhythm: Regular Rhythm        Mental Status:  Mental Status Findings:  Cooperative, Alert and Oriented         Anesthesia Plan  Type of Anesthesia, risks & benefits discussed:  Anesthesia Type:  general  Patient's Preference:   Intra-op Monitoring Plan:   Intra-op Monitoring Plan Comments:   Post Op Pain Control Plan: multimodal analgesia  Post Op Pain Control Plan Comments:   Induction:   IV  Beta Blocker:  Patient is not currently on a Beta-Blocker (No further documentation required).       Informed Consent: Patient understands risks and agrees with Anesthesia plan.  Questions answered. Anesthesia consent signed with patient.  ASA Score: 3     Day of Surgery Review of History & Physical:    H&P update referred to the surgeon.     Anesthesia Plan Notes: Stat blood glucose 165.        Ready For Surgery From Anesthesia Perspective.

## 2018-01-04 NOTE — TRANSFER OF CARE
"Anesthesia Transfer of Care Note    Patient: Justa Acosta    Procedure(s) Performed: Procedure(s) (LRB):  ERCP (N/A)    Patient location: PACU    Anesthesia Type: general    Transport from OR: Transported from OR on 2-3 L/min O2 by NC with adequate spontaneous ventilation    Post pain: adequate analgesia    Post assessment: no apparent anesthetic complications and tolerated procedure well    Post vital signs: stable    Level of consciousness: awake, alert and oriented    Nausea/Vomiting: no nausea/vomiting    Complications: none    Transfer of care protocol was followed      Last vitals:   Visit Vitals  BP (!) 145/65 (BP Location: Left arm, Patient Position: Lying)   Pulse (!) 59   Temp 36.6 °C (97.8 °F) (Oral)   Resp 20   Ht 5' 2.5" (1.588 m)   Wt 80.7 kg (178 lb)   SpO2 96%   Breastfeeding? No   BMI 32.04 kg/m²     "

## 2018-01-04 NOTE — DISCHARGE INSTRUCTIONS
ERCP (Endoscopic Retrograde Cholangiopancreatography)     A balloon at the tip of a catheter opens above the stone. The stone is pulled out of the duct and leaves your body through stool.     ERCP stands for endoscopic retrograde cholangiopancreatography. This procedure is used to view the biliary and pancreatic ducts.  It is used to evaluate diseases that affect the ducts and to help locate and treat blockages that may be present.  How do I get ready for ERCP?  · Talk to your doctor about any health problems or allergies you have.  · Ask your doctor about the risks of ERCP. These include pancreatitis, infection, bleeding, and tearing the bowel.  · Be sure your doctor knows about all medicines you take. You may be told to stop taking some or all of them before the test. This includes:  · All prescription medicines  · Over-the-counter medicines that don't need a prescription  ·  Any street drugs you may use   · Herbs, vitamins, kelp, seaweed, cough syrups, and other supplements  · You may be asked to take antibiotics ahead of time.  · Avoid blood-thinning medicines for 1 week before ERCP.  · Do not eat or drink for 8 to 12 hours before ERCP.  · Have someone ready to take you home.  What happens during the procedure?  · You may be given medicine through an IV to help you relax.  · Your throat is numbed.  · A thin tube (endoscope) is placed into your throat. It is advanced from the throat through the upper digestive tract, to the common bile duct opening. The endoscope lets the doctor see the common bile and pancreatic ducts on a video screen.  · A cut may be made where the common bile duct opens to the duodenum to make it easier to remove stones.  · As blockages are located and removed, X-rays are taken.  · Contrast dye is injected through a catheter to make the duct show up better on the X-rays.  · An imaging technique that uses X-rays to obtain real-time moving images of internal organs is called fluoroscopy.  Fluoroscopy is used to watch and guide progress of the procedure.   · In some cases, a plastic tube (stent) is placed to hold the ducts open. This stent may be replaced or removed in 6 to 8 weeks. Or it may be left to fall out on its own and be passed in the stool.  What happens after ERCP?  Your doctor may discuss the test results right away or a return visit may be scheduled. You may go home the same day or spend the night in the hospital. Follow these tips:  · You can return to a normal routine the day after the ERCP.  · If a cut was made in the duct, avoid blood-thinning medicines such as aspirin for 5 to 7 days.  · Call your doctor right away if you have a fever or abdominal pain. These may be signs of an infection or torn bowel.   Date Last Reviewed: 6/19/2015  © 7274-4389 [employer]. [address] [place] [address], [place] [address]. All rights reserved. This information is not intended as a substitute for professional medical care. Always follow your healthcare professional's instructions.

## 2018-01-05 NOTE — ANESTHESIA POSTPROCEDURE EVALUATION
"Anesthesia Post Evaluation    Patient: Justa Acosta    Procedure(s) Performed: Procedure(s) (LRB):  ERCP (N/A)    Final Anesthesia Type: general  Patient location during evaluation: PACU  Patient participation: Yes- Able to Participate  Level of consciousness: awake and alert  Post-procedure vital signs: reviewed and stable  Pain management: adequate  Airway patency: patent  PONV status at discharge: No PONV  Anesthetic complications: no      Cardiovascular status: blood pressure returned to baseline  Respiratory status: unassisted  Hydration status: euvolemic  Follow-up not needed.        Visit Vitals  BP (!) 156/59 (BP Location: Left arm, Patient Position: Lying)   Pulse 60   Temp 36.7 °C (98 °F) (Temporal)   Resp 16   Ht 5' 2.5" (1.588 m)   Wt 80.7 kg (178 lb)   SpO2 100%   Breastfeeding? No   BMI 32.04 kg/m²       Pain/Bridgette Score: Pain Assessment Performed: Yes (1/4/2018  4:20 PM)  Presence of Pain: denies (notes she is comfortable) (1/4/2018  4:20 PM)  Pain Rating Prior to Med Admin: 5 (headache) (1/4/2018  3:33 PM)  Bridgette Score: 9 (1/4/2018  3:30 PM)      "

## 2020-08-18 ENCOUNTER — ANESTHESIA (OUTPATIENT)
Dept: ENDOSCOPY | Facility: HOSPITAL | Age: 80
DRG: 445 | End: 2020-08-18
Payer: MEDICARE

## 2020-08-18 ENCOUNTER — HOSPITAL ENCOUNTER (INPATIENT)
Facility: HOSPITAL | Age: 80
LOS: 1 days | Discharge: HOME HEALTH | DRG: 445 | End: 2020-08-19
Attending: HOSPITALIST | Admitting: HOSPITALIST
Payer: MEDICARE

## 2020-08-18 ENCOUNTER — ANESTHESIA EVENT (OUTPATIENT)
Dept: ENDOSCOPY | Facility: HOSPITAL | Age: 80
DRG: 445 | End: 2020-08-18
Payer: MEDICARE

## 2020-08-18 DIAGNOSIS — R07.9 CHEST PAIN: ICD-10-CM

## 2020-08-18 DIAGNOSIS — R74.8 ABNORMAL LIVER ENZYMES: ICD-10-CM

## 2020-08-18 DIAGNOSIS — K83.09 CHOLANGITIS: Primary | ICD-10-CM

## 2020-08-18 DIAGNOSIS — R74.01 TRANSAMINITIS: ICD-10-CM

## 2020-08-18 PROBLEM — F03.90 DEMENTIA WITHOUT BEHAVIORAL DISTURBANCE: Status: ACTIVE | Noted: 2020-08-18

## 2020-08-18 LAB
ALBUMIN SERPL BCP-MCNC: 3.4 G/DL (ref 3.5–5.2)
ALP SERPL-CCNC: 295 U/L (ref 55–135)
ALT SERPL W/O P-5'-P-CCNC: 622 U/L (ref 10–44)
ANION GAP SERPL CALC-SCNC: 6 MMOL/L (ref 8–16)
AST SERPL-CCNC: 497 U/L (ref 10–40)
BASOPHILS # BLD AUTO: 0.02 K/UL (ref 0–0.2)
BASOPHILS NFR BLD: 0.2 % (ref 0–1.9)
BILIRUB SERPL-MCNC: 0.9 MG/DL (ref 0.1–1)
BUN SERPL-MCNC: 12 MG/DL (ref 8–23)
CALCIUM SERPL-MCNC: 9.4 MG/DL (ref 8.7–10.5)
CHLORIDE SERPL-SCNC: 103 MMOL/L (ref 95–110)
CO2 SERPL-SCNC: 33 MMOL/L (ref 23–29)
CREAT SERPL-MCNC: 0.7 MG/DL (ref 0.5–1.4)
DIFFERENTIAL METHOD: ABNORMAL
EOSINOPHIL # BLD AUTO: 0.1 K/UL (ref 0–0.5)
EOSINOPHIL NFR BLD: 1.2 % (ref 0–8)
ERYTHROCYTE [DISTWIDTH] IN BLOOD BY AUTOMATED COUNT: 13.7 % (ref 11.5–14.5)
EST. GFR  (AFRICAN AMERICAN): >60 ML/MIN/1.73 M^2
EST. GFR  (NON AFRICAN AMERICAN): >60 ML/MIN/1.73 M^2
ESTIMATED AVG GLUCOSE: 137 MG/DL (ref 68–131)
GLUCOSE SERPL-MCNC: 109 MG/DL (ref 70–110)
HBA1C MFR BLD HPLC: 6.4 % (ref 4–5.6)
HCT VFR BLD AUTO: 38 % (ref 37–48.5)
HGB BLD-MCNC: 11.6 G/DL (ref 12–16)
IMM GRANULOCYTES # BLD AUTO: 0.04 K/UL (ref 0–0.04)
IMM GRANULOCYTES NFR BLD AUTO: 0.4 % (ref 0–0.5)
INR PPP: 1 (ref 0.8–1.2)
LACTATE SERPL-SCNC: 1 MMOL/L (ref 0.5–2.2)
LYMPHOCYTES # BLD AUTO: 0.4 K/UL (ref 1–4.8)
LYMPHOCYTES NFR BLD: 4.3 % (ref 18–48)
MAGNESIUM SERPL-MCNC: 1.8 MG/DL (ref 1.6–2.6)
MCH RBC QN AUTO: 28.4 PG (ref 27–31)
MCHC RBC AUTO-ENTMCNC: 30.5 G/DL (ref 32–36)
MCV RBC AUTO: 93 FL (ref 82–98)
MONOCYTES # BLD AUTO: 0.7 K/UL (ref 0.3–1)
MONOCYTES NFR BLD: 7.4 % (ref 4–15)
NEUTROPHILS # BLD AUTO: 8.2 K/UL (ref 1.8–7.7)
NEUTROPHILS NFR BLD: 86.5 % (ref 38–73)
NRBC BLD-RTO: 0 /100 WBC
PHOSPHATE SERPL-MCNC: 3.1 MG/DL (ref 2.7–4.5)
PLATELET # BLD AUTO: 94 K/UL (ref 150–350)
PMV BLD AUTO: 11.7 FL (ref 9.2–12.9)
POCT GLUCOSE: 124 MG/DL (ref 70–110)
POCT GLUCOSE: 99 MG/DL (ref 70–110)
POTASSIUM SERPL-SCNC: 4.1 MMOL/L (ref 3.5–5.1)
PROT SERPL-MCNC: 7 G/DL (ref 6–8.4)
PROTHROMBIN TIME: 10.8 SEC (ref 9–12.5)
RBC # BLD AUTO: 4.08 M/UL (ref 4–5.4)
SODIUM SERPL-SCNC: 142 MMOL/L (ref 136–145)
TROPONIN I SERPL DL<=0.01 NG/ML-MCNC: <0.006 NG/ML (ref 0–0.03)
WBC # BLD AUTO: 9.52 K/UL (ref 3.9–12.7)

## 2020-08-18 PROCEDURE — D9220A PRA ANESTHESIA: Mod: CRNA,,, | Performed by: NURSE ANESTHETIST, CERTIFIED REGISTERED

## 2020-08-18 PROCEDURE — 27202125 HC BALLOON, EXTRACTION (ANY): Performed by: INTERNAL MEDICINE

## 2020-08-18 PROCEDURE — 25000003 PHARM REV CODE 250: Performed by: STUDENT IN AN ORGANIZED HEALTH CARE EDUCATION/TRAINING PROGRAM

## 2020-08-18 PROCEDURE — C2617 STENT, NON-COR, TEM W/O DEL: HCPCS | Performed by: INTERNAL MEDICINE

## 2020-08-18 PROCEDURE — 93010 EKG 12-LEAD: ICD-10-PCS | Mod: ,,, | Performed by: INTERNAL MEDICINE

## 2020-08-18 PROCEDURE — 85025 COMPLETE CBC W/AUTO DIFF WBC: CPT

## 2020-08-18 PROCEDURE — C1726 CATH, BAL DIL, NON-VASCULAR: HCPCS | Performed by: INTERNAL MEDICINE

## 2020-08-18 PROCEDURE — 93010 ELECTROCARDIOGRAM REPORT: CPT | Mod: ,,, | Performed by: INTERNAL MEDICINE

## 2020-08-18 PROCEDURE — C1769 GUIDE WIRE: HCPCS | Performed by: INTERNAL MEDICINE

## 2020-08-18 PROCEDURE — 94761 N-INVAS EAR/PLS OXIMETRY MLT: CPT

## 2020-08-18 PROCEDURE — U0003 INFECTIOUS AGENT DETECTION BY NUCLEIC ACID (DNA OR RNA); SEVERE ACUTE RESPIRATORY SYNDROME CORONAVIRUS 2 (SARS-COV-2) (CORONAVIRUS DISEASE [COVID-19]), AMPLIFIED PROBE TECHNIQUE, MAKING USE OF HIGH THROUGHPUT TECHNOLOGIES AS DESCRIBED BY CMS-2020-01-R: HCPCS

## 2020-08-18 PROCEDURE — 25500020 PHARM REV CODE 255: Performed by: INTERNAL MEDICINE

## 2020-08-18 PROCEDURE — 11000001 HC ACUTE MED/SURG PRIVATE ROOM

## 2020-08-18 PROCEDURE — 63600175 PHARM REV CODE 636 W HCPCS: Performed by: STUDENT IN AN ORGANIZED HEALTH CARE EDUCATION/TRAINING PROGRAM

## 2020-08-18 PROCEDURE — 80053 COMPREHEN METABOLIC PANEL: CPT

## 2020-08-18 PROCEDURE — 43264 PR ERCP,W/REMOVAL STONE,BIL/PANCR DUCTS: ICD-10-PCS | Mod: 51,,, | Performed by: INTERNAL MEDICINE

## 2020-08-18 PROCEDURE — D9220A PRA ANESTHESIA: ICD-10-PCS | Mod: ANES,,, | Performed by: ANESTHESIOLOGY

## 2020-08-18 PROCEDURE — 83605 ASSAY OF LACTIC ACID: CPT

## 2020-08-18 PROCEDURE — D9220A PRA ANESTHESIA: ICD-10-PCS | Mod: CRNA,,, | Performed by: NURSE ANESTHETIST, CERTIFIED REGISTERED

## 2020-08-18 PROCEDURE — 36415 COLL VENOUS BLD VENIPUNCTURE: CPT

## 2020-08-18 PROCEDURE — D9220A PRA ANESTHESIA: Mod: ANES,,, | Performed by: ANESTHESIOLOGY

## 2020-08-18 PROCEDURE — 43274 ERCP DUCT STENT PLACEMENT: CPT | Mod: ,,, | Performed by: INTERNAL MEDICINE

## 2020-08-18 PROCEDURE — 84100 ASSAY OF PHOSPHORUS: CPT

## 2020-08-18 PROCEDURE — 43274 ERCP DUCT STENT PLACEMENT: CPT | Performed by: INTERNAL MEDICINE

## 2020-08-18 PROCEDURE — 43264 ERCP REMOVE DUCT CALCULI: CPT | Mod: 51,,, | Performed by: INTERNAL MEDICINE

## 2020-08-18 PROCEDURE — 99223 PR INITIAL HOSPITAL CARE,LEVL III: ICD-10-PCS | Mod: 25,,, | Performed by: INTERNAL MEDICINE

## 2020-08-18 PROCEDURE — 37000008 HC ANESTHESIA 1ST 15 MINUTES: Performed by: INTERNAL MEDICINE

## 2020-08-18 PROCEDURE — 83735 ASSAY OF MAGNESIUM: CPT

## 2020-08-18 PROCEDURE — 74328 X-RAY BILE DUCT ENDOSCOPY: CPT | Performed by: INTERNAL MEDICINE

## 2020-08-18 PROCEDURE — 84484 ASSAY OF TROPONIN QUANT: CPT

## 2020-08-18 PROCEDURE — 74328 X-RAY BILE DUCT ENDOSCOPY: CPT | Mod: 26,,, | Performed by: INTERNAL MEDICINE

## 2020-08-18 PROCEDURE — 25000003 PHARM REV CODE 250: Performed by: NURSE ANESTHETIST, CERTIFIED REGISTERED

## 2020-08-18 PROCEDURE — 27202304 HC CANNULA, ERCP: Performed by: INTERNAL MEDICINE

## 2020-08-18 PROCEDURE — 99223 1ST HOSP IP/OBS HIGH 75: CPT | Mod: 25,,, | Performed by: INTERNAL MEDICINE

## 2020-08-18 PROCEDURE — 25000003 PHARM REV CODE 250: Performed by: INTERNAL MEDICINE

## 2020-08-18 PROCEDURE — 63600175 PHARM REV CODE 636 W HCPCS: Performed by: NURSE ANESTHETIST, CERTIFIED REGISTERED

## 2020-08-18 PROCEDURE — 82962 GLUCOSE BLOOD TEST: CPT | Performed by: INTERNAL MEDICINE

## 2020-08-18 PROCEDURE — 83036 HEMOGLOBIN GLYCOSYLATED A1C: CPT

## 2020-08-18 PROCEDURE — 37000009 HC ANESTHESIA EA ADD 15 MINS: Performed by: INTERNAL MEDICINE

## 2020-08-18 PROCEDURE — 99222 PR INITIAL HOSPITAL CARE,LEVL II: ICD-10-PCS | Mod: AI,GC,, | Performed by: INTERNAL MEDICINE

## 2020-08-18 PROCEDURE — 99222 1ST HOSP IP/OBS MODERATE 55: CPT | Mod: AI,GC,, | Performed by: INTERNAL MEDICINE

## 2020-08-18 PROCEDURE — 85610 PROTHROMBIN TIME: CPT

## 2020-08-18 PROCEDURE — 27202127 HC STENT INTRODUCER: Performed by: INTERNAL MEDICINE

## 2020-08-18 PROCEDURE — 43274 PR ERCP W/STENT PLCMNT BILIARY/PANCREATIC DUCT: ICD-10-PCS | Mod: ,,, | Performed by: INTERNAL MEDICINE

## 2020-08-18 PROCEDURE — 74328 PR  X-RAY FOR BILE DUCT ENDOSCOPY: ICD-10-PCS | Mod: 26,,, | Performed by: INTERNAL MEDICINE

## 2020-08-18 PROCEDURE — 43264 ERCP REMOVE DUCT CALCULI: CPT | Performed by: INTERNAL MEDICINE

## 2020-08-18 PROCEDURE — 93005 ELECTROCARDIOGRAM TRACING: CPT

## 2020-08-18 RX ORDER — PILOCARPINE HYDROCHLORIDE 5 MG/1
10 TABLET, FILM COATED ORAL EVERY MORNING
Status: DISCONTINUED | OUTPATIENT
Start: 2020-08-18 | End: 2020-08-19 | Stop reason: HOSPADM

## 2020-08-18 RX ORDER — INSULIN ASPART 100 [IU]/ML
0-5 INJECTION, SOLUTION INTRAVENOUS; SUBCUTANEOUS EVERY 6 HOURS PRN
Status: DISCONTINUED | OUTPATIENT
Start: 2020-08-18 | End: 2020-08-19 | Stop reason: HOSPADM

## 2020-08-18 RX ORDER — PILOCARPINE HYDROCHLORIDE 5 MG/1
5 TABLET, FILM COATED ORAL NIGHTLY
Status: DISCONTINUED | OUTPATIENT
Start: 2020-08-18 | End: 2020-08-19 | Stop reason: HOSPADM

## 2020-08-18 RX ORDER — GLUCAGON 1 MG
1 KIT INJECTION
Status: DISCONTINUED | OUTPATIENT
Start: 2020-08-18 | End: 2020-08-19 | Stop reason: HOSPADM

## 2020-08-18 RX ORDER — PROPOFOL 10 MG/ML
VIAL (ML) INTRAVENOUS
Status: DISCONTINUED | OUTPATIENT
Start: 2020-08-18 | End: 2020-08-18

## 2020-08-18 RX ORDER — SODIUM CHLORIDE 9 MG/ML
INJECTION, SOLUTION INTRAVENOUS CONTINUOUS PRN
Status: DISCONTINUED | OUTPATIENT
Start: 2020-08-18 | End: 2020-08-18

## 2020-08-18 RX ORDER — FESOTERODINE FUMARATE 4 MG/1
4 TABLET, FILM COATED, EXTENDED RELEASE ORAL NIGHTLY
COMMUNITY

## 2020-08-18 RX ORDER — ONDANSETRON 2 MG/ML
INJECTION INTRAMUSCULAR; INTRAVENOUS
Status: DISCONTINUED | OUTPATIENT
Start: 2020-08-18 | End: 2020-08-18

## 2020-08-18 RX ORDER — NAPROXEN 250 MG/1
250 TABLET ORAL 2 TIMES DAILY WITH MEALS
Status: DISCONTINUED | OUTPATIENT
Start: 2020-08-18 | End: 2020-08-18

## 2020-08-18 RX ORDER — AMITRIPTYLINE HYDROCHLORIDE 10 MG/1
10 TABLET, FILM COATED ORAL NIGHTLY
Status: DISCONTINUED | OUTPATIENT
Start: 2020-08-18 | End: 2020-08-19 | Stop reason: HOSPADM

## 2020-08-18 RX ORDER — OXYMETAZOLINE HCL 0.05 %
2 SPRAY, NON-AEROSOL (ML) NASAL DAILY PRN
Status: DISCONTINUED | OUTPATIENT
Start: 2020-08-18 | End: 2020-08-19

## 2020-08-18 RX ORDER — LIDOCAINE HYDROCHLORIDE 20 MG/ML
INJECTION INTRAVENOUS
Status: DISCONTINUED | OUTPATIENT
Start: 2020-08-18 | End: 2020-08-18

## 2020-08-18 RX ORDER — CETIRIZINE HYDROCHLORIDE 10 MG/1
10 TABLET ORAL NIGHTLY
COMMUNITY

## 2020-08-18 RX ORDER — ATORVASTATIN CALCIUM 40 MG/1
40 TABLET, FILM COATED ORAL NIGHTLY
COMMUNITY

## 2020-08-18 RX ORDER — INDOMETHACIN 50 MG/1
SUPPOSITORY RECTAL
Status: COMPLETED | OUTPATIENT
Start: 2020-08-18 | End: 2020-08-18

## 2020-08-18 RX ORDER — DONEPEZIL HYDROCHLORIDE 10 MG/1
10 TABLET, FILM COATED ORAL NIGHTLY
Status: DISCONTINUED | OUTPATIENT
Start: 2020-08-18 | End: 2020-08-19 | Stop reason: HOSPADM

## 2020-08-18 RX ORDER — NAPROXEN 250 MG/1
250 TABLET ORAL ONCE
Status: COMPLETED | OUTPATIENT
Start: 2020-08-18 | End: 2020-08-18

## 2020-08-18 RX ORDER — SODIUM CHLORIDE 0.9 % (FLUSH) 0.9 %
10 SYRINGE (ML) INJECTION
Status: DISCONTINUED | OUTPATIENT
Start: 2020-08-18 | End: 2020-08-19 | Stop reason: HOSPADM

## 2020-08-18 RX ORDER — FLUOXETINE HYDROCHLORIDE 20 MG/1
20 CAPSULE ORAL EVERY MORNING
Status: DISCONTINUED | OUTPATIENT
Start: 2020-08-18 | End: 2020-08-19 | Stop reason: HOSPADM

## 2020-08-18 RX ORDER — TOLTERODINE 4 MG/1
4 CAPSULE, EXTENDED RELEASE ORAL DAILY
Status: ON HOLD | COMMUNITY
End: 2020-08-19 | Stop reason: HOSPADM

## 2020-08-18 RX ORDER — PROPOFOL 10 MG/ML
VIAL (ML) INTRAVENOUS CONTINUOUS PRN
Status: DISCONTINUED | OUTPATIENT
Start: 2020-08-18 | End: 2020-08-18

## 2020-08-18 RX ORDER — POLYETHYLENE GLYCOL 3350 17 G/17G
17 POWDER, FOR SOLUTION ORAL DAILY
Status: DISCONTINUED | OUTPATIENT
Start: 2020-08-19 | End: 2020-08-19 | Stop reason: HOSPADM

## 2020-08-18 RX ORDER — PANTOPRAZOLE SODIUM 40 MG/1
40 TABLET, DELAYED RELEASE ORAL EVERY MORNING
COMMUNITY

## 2020-08-18 RX ADMIN — IOHEXOL 10 ML: 350 INJECTION, SOLUTION INTRAVENOUS at 04:08

## 2020-08-18 RX ADMIN — PILOCARPINE HYDROCHLORIDE 5 MG: 5 TABLET, FILM COATED ORAL at 08:08

## 2020-08-18 RX ADMIN — INDOMETHACIN 100 MG: 50 SUPPOSITORY RECTAL at 04:08

## 2020-08-18 RX ADMIN — PROPOFOL 70 MG: 10 INJECTION, EMULSION INTRAVENOUS at 03:08

## 2020-08-18 RX ADMIN — FLUOXETINE 20 MG: 20 CAPSULE ORAL at 11:08

## 2020-08-18 RX ADMIN — DONEPEZIL HYDROCHLORIDE 10 MG: 10 TABLET ORAL at 08:08

## 2020-08-18 RX ADMIN — PIPERACILLIN SODIUM AND TAZOBACTAM SODIUM 4.5 G: 4; .5 INJECTION, POWDER, LYOPHILIZED, FOR SOLUTION INTRAVENOUS at 09:08

## 2020-08-18 RX ADMIN — PROPOFOL 90 MCG/KG/MIN: 10 INJECTION, EMULSION INTRAVENOUS at 03:08

## 2020-08-18 RX ADMIN — ONDANSETRON 4 MG: 2 INJECTION INTRAMUSCULAR; INTRAVENOUS at 03:08

## 2020-08-18 RX ADMIN — NAPROXEN 250 MG: 250 TABLET ORAL at 11:08

## 2020-08-18 RX ADMIN — PROMETHAZINE HYDROCHLORIDE 6.25 MG: 25 INJECTION INTRAMUSCULAR; INTRAVENOUS at 09:08

## 2020-08-18 RX ADMIN — ALPRAZOLAM 0.12 MG: 0.25 TABLET ORAL at 08:08

## 2020-08-18 RX ADMIN — LIDOCAINE HYDROCHLORIDE 60 MG: 20 INJECTION, SOLUTION INTRAVENOUS at 03:08

## 2020-08-18 RX ADMIN — SODIUM CHLORIDE: 0.9 INJECTION, SOLUTION INTRAVENOUS at 02:08

## 2020-08-18 RX ADMIN — PILOCARPINE HYDROCHLORIDE 10 MG: 5 TABLET, FILM COATED ORAL at 11:08

## 2020-08-18 RX ADMIN — AMITRIPTYLINE HYDROCHLORIDE 10 MG: 10 TABLET, FILM COATED ORAL at 08:08

## 2020-08-18 RX ADMIN — ALPRAZOLAM 0.12 MG: 0.25 TABLET ORAL at 11:08

## 2020-08-18 RX ADMIN — PIPERACILLIN SODIUM AND TAZOBACTAM SODIUM 4.5 G: 4; .5 INJECTION, POWDER, LYOPHILIZED, FOR SOLUTION INTRAVENOUS at 05:08

## 2020-08-18 NOTE — TREATMENT PLAN
GI Treatment Plan    Justa Acosta is a 80 y.o. female admitted to hospital 8/18/2020 (Hospital Day: 1) due to Transaminitis.     Interval History  - ERCP Today  - - The major papilla was located partially within a                         diverticulum.                         - Prior biliary sphincterotomy appeared open.                         - Choledocholithiasis was found. Partial removal                         was accomplished with balloon extraction; a stent                         was inserted.                         - Bile duct orifice was successfully dilated.                         - The biliary tree was swept.                         - One plastic stent was placed into the common bile                         duct.       Plan  - s/p procedure, see formal note for additional details  - complete course of antibiotics for cholangitis  - repeat ERCP in 3-4 wk we will schedule  - Plan of care was discussed with primary team.  - We will continue to follow.    Thank you for involving us in the care of Justa Acosta. Please call with any additional questions, concerns or changes in the patient's clinical status.    Jacob Hurtado MD  Gastroenterology Fellow

## 2020-08-18 NOTE — PLAN OF CARE
(Physician in Lead of Transfers)   Outside Transfer Acceptance Note / [facility]    Upon patient arrival to floor, please call extension [phone] (if no answer, this will flip to a beeper, so enter your call back number) for Hospital Medicine admit team assignment and for additional admit orders for the patient.  Do not page the attending physician associated with the patient on arrival (this physician may not be on duty at the time of arrival).  Rather, always call [phone] to reach the triage physician for orders and team assignment.    Transferring Physician: Cooper Tubbs    Accepting Physician: Francisco Javier Deshpande MD    Date of Acceptance: 08/18/2020    Transferring Facility: [facility]    Reason for Transfer: higher level of care    Report from Transferring Physician/Hospital course:  Patient is a 80 y.o. female who has a past medical history of cholangitis with bilary stent in place, Coronary artery disease, Diabetes mellitus, GERD (gastroesophageal reflux disease), Hypertension, Infectious viral hepatitis, Jaundice due to hepatitis, Mild carotid artery disease, Mitral valve prolapse presented to [facility] with complaints of fever/chills and right upper quadrant pain. She arrived with a temp of 100.8. Patient was found to have elevated LFTs. AST 1099,  Alk phos 324, Tbili 0.7. RUQ sono gram showed dilated intra and extra hepatic ducts and dilated CBD. No evidence of stone. CT scan of abdomen with no acute findings. ED MD discussed case with patients GI doctor who was concerned for cholangitis and stated patient will need an ERCP. Facility not able to perform ERCP and thus seeking transfer. She was started on Vanc and Zosyn. Lactic acid 2.2. WBC count normal. Hemodynamically stable. [facility] aware of patient and will consult on patient in AM.     Labs & Radiographs: see EPIC    Significant Labs: see EPIC    Significant Imaging: see EPIC    To Do List:    1. Admit to HM  2. Cont IV abx  3. Consult [facility].       Upon patient arrival to floor, please call extension [phone] (if no answer, this will flip to a beeper, so enter your call back number) for Hospital Medicine admit team assignment and for additional admit orders for the patient.  Do not page the attending physician associated with the patient on arrival (this physician may not be on duty at the time of arrival).  Rather, always call [phone] to reach the triage physician for orders and team assignment.      Francisco Javier Deshpande MD  Hospital Medicine Staff
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
## 2020-08-18 NOTE — HPI
Justa Acosta is an 80 year old female with history of coronary artery disease, type 2 diabetes mellitus, dementia, PAD s/p aorto bi-fem stent, GERD, HTN, MVP, sjogren disease, cholecystectomy who presents with fever and abdominal pain at OSH. Patient states she had a 1 day history of fever measure at 100.8 with associated chills. Patient has also had 1 day history of dull 4-5/10 right abdominal pain that is non radiating. She describes being nauseous and having 1-2 episodes of bilious nonbloody vomiting. She also describes a 1-2 day history of loose nonbloody bowel movements.     At [facility], patient was found to have elevated liver enzymes (AST 1099, , ), total bilirubin of 0.7, and lactate 2.2. Right upper quadrant ultrasound with consistent with intra and extrahepatic biliary dilatation, and dilatation of the common bile duct without evidence of gall stones. Patient was given vancomycin and zosyn at OSH, transferred to [facility] for advanced endoscopy services for concerns of cholangitis.     Patient has history of cholangitis and underwent ERCP in 12/2017 with subsequent sphincterotomy and stent placement (removed 1/2018). Patient on ursodiol.

## 2020-08-18 NOTE — DISCHARGE INSTRUCTIONS
ERCP (Endoscopic Retrograde Cholangiopancreatography)     A balloon at the tip of a catheter opens above the stone. The stone is pulled out of the duct and leaves your body through stool.     ERCP stands for endoscopic retrograde cholangiopancreatography. This procedure is used to view the biliary and pancreatic ducts.  It is used to evaluate diseases that affect the ducts and to help locate and treat blockages that may be present.  How do I get ready for ERCP?  · Talk to your doctor about any health problems or allergies you have.  · Ask your doctor about the risks of ERCP. These include pancreatitis, infection, bleeding, and tearing the bowel.  · Be sure your doctor knows about all medicines you take. You may be told to stop taking some or all of them before the test. This includes:  · All prescription medicines  · Over-the-counter medicines that don't need a prescription  ·  Any street drugs you may use   · Herbs, vitamins, kelp, seaweed, cough syrups, and other supplements  · You may be asked to take antibiotics ahead of time.  · Avoid blood-thinning medicines for 1 week before ERCP.  · Do not eat or drink for 8 to 12 hours before ERCP.  · Have someone ready to take you home.  What happens during the procedure?  · You may be given medicine through an IV to help you relax.  · Your throat is numbed.  · A thin tube (endoscope) is placed into your throat. It is advanced from the throat through the upper digestive tract, to the common bile duct opening. The endoscope lets the doctor see the common bile and pancreatic ducts on a video screen.  · A cut may be made where the common bile duct opens to the duodenum to make it easier to remove stones.  · As blockages are located and removed, X-rays are taken.  · Contrast dye is injected through a catheter to make the duct show up better on the X-rays.  · An imaging technique that uses X-rays to obtain real-time moving images of internal organs is called fluoroscopy.  Fluoroscopy is used to watch and guide progress of the procedure.   · In some cases, a plastic tube (stent) is placed to hold the ducts open. This stent may be replaced or removed in 6 to 8 weeks. Or it may be left to fall out on its own and be passed in the stool.  What happens after ERCP?  Your doctor may discuss the test results right away or a return visit may be scheduled. You may go home the same day or spend the night in the hospital. Follow these tips:  · You can return to a normal routine the day after the ERCP.  · If a cut was made in the duct, avoid blood-thinning medicines such as aspirin for 5 to 7 days.  · Call your doctor right away if you have a fever or abdominal pain. These may be signs of an infection or torn bowel.   Date Last Reviewed: 6/19/2015  © 7136-8750 [employer]. [address] [place] [address], [place] [address]. All rights reserved. This information is not intended as a substitute for professional medical care. Always follow your healthcare professional's instructions.

## 2020-08-18 NOTE — PROGRESS NOTES
Report called to [facility],pt made ready for transport to [facility] via stretcher per PCT, daughter updated via telephone, pt resting quietly,VSS,resp unlabored sunny po fluids,stable at present.

## 2020-08-18 NOTE — PLAN OF CARE
Pt stable. Daughter present at bedside. Denies pain or discomfort. IV antibiotics in progress. Free from falls and injuries. WCTM
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
## 2020-08-18 NOTE — PLAN OF CARE
CM met with patient and daughter for discharge planning.  Patient states she lives with her spouse in a one story house with no steps to enter.  Patient was transferred from [facility].  Patient ambulates with a standard walker.  She has a maid that assists her from 9:30-1:30 and a sitter in the afternoon from 1:30-4:00.  Otherwise the rest of the time her spouse is there to assist her.  Patient is current with [facility].  Plan is to discharge home with continued [facility].    Pharmacy:    [facility] - [place], [place] - [address]  [address]  [facility] [address]  Phone: [phone] Fax: [phone]    PCP:  Yordy Collier MD    Payor: MEDICARE / Plan: MEDICARE PART A & B / Product Type: [facility] /      08/18/20 1316   Discharge Assessment   Assessment Type Discharge Planning Assessment   Confirmed/corrected address and phone number on facesheet? Yes   Assessment information obtained from? Patient   Expected Length of Stay (days) 3   Prior to hospitilization cognitive status: Alert/Oriented   Prior to hospitalization functional status: Assistive Equipment  (Standard Walker)   Current cognitive status: Alert/Oriented   Current Functional Status: Assistive Equipment   Facility Arrived From: From [facility]   Lives With spouse   Able to Return to Prior Arrangements yes   Is patient able to care for self after discharge? Unable to determine at this time (comments)   Patient's perception of discharge disposition home health   Readmission Within the Last 30 Days no previous admission in last 30 days   Patient currently being followed by outpatient case management? No   Patient currently receives any other outside agency services? No   Equipment Currently Used at Home walker, standard;bedside commode;wheelchair;bath bench;grab bar   Do you have any problems affording any of your prescribed medications? No   Is the patient taking  medications as prescribed? yes   Does the patient have transportation home? Yes   Transportation Anticipated family or friend will provide   Does the patient receive services at the [facility]? No   Discharge Plan A Home Health   Discharge Plan B Skilled Nursing Facility   DME Needed Upon Discharge  none   Patient/Family in Agreement with Plan yes

## 2020-08-18 NOTE — [employer] PATIENT INSTRUCTIONS
Discharge Summary/Instructions after an Endoscopic Procedure  Patient Name: Justa Acosta  Patient MRN: [id number]  Patient YOB: 1940 Tuesday, August 18, 2020  Chente Sutherland MD  RESTRICTIONS:  During your procedure today, you received medications for sedation.  These   medications may affect your judgment, balance and coordination.  Therefore,   for 24 hours, you have the following restrictions:   - DO NOT drive a car, operate machinery, make legal/financial decisions,   sign important papers or drink alcohol.    ACTIVITY:  Today: no heavy lifting, straining or running due to procedural   sedation/anesthesia.  The following day: return to full activity including work.  DIET:  Eat and drink normally unless instructed otherwise.     TREATMENT FOR COMMON SIDE EFFECTS:  - Mild abdominal pain, nausea, belching, bloating or excessive gas:  rest,   eat lightly and use a heating pad.  - Sore Throat: treat with throat lozenges and/or gargle with warm salt   water.  - Because air was used during the procedure, expelling large amounts of air   from your rectum or belching is normal.  - If a bowel prep was taken, you may not have a bowel movement for 1-3 days.    This is normal.  SYMPTOMS TO WATCH FOR AND REPORT TO YOUR PHYSICIAN:  1. Abdominal pain or bloating, other than gas cramps.  2. Chest pain.  3. Back pain.  4. Signs of infection such as: chills or fever occurring within 24 hours   after the procedure.  5. Rectal bleeding, which would show as bright red, maroon, or black stools.   (A tablespoon of blood from the rectum is not serious, especially if   hemorrhoids are present.)  6. Vomiting.  7. Weakness or dizziness.  GO DIRECTLY TO THE NEAREST EMERGENCY ROOM IF YOU HAVE ANY OF THE FOLLOWING:      Difficulty breathing              Chills and/or fever over 101 F   Persistent vomiting and/or vomiting blood   Severe abdominal pain   Severe chest pain   Black, tarry stools   Bleeding- more than one  tablespoon   Any other symptom or condition that you feel may need urgent attention  Your doctor recommends these additional instructions:  If any biopsies were taken, your doctors clinic will contact you in 1 to 2   weeks with any results.  - Return patient to [facility] for ongoing care.   - Complete a course of Abx for cholangitis.  - Continue ursodiol  - Repeat ERCP in 3-4 weeks for retreatment. Would book ERCP as a 90min case.   May need cholangioscopy with [facility].  For questions, problems or results please call your physician - Chente Sutherland MD at Work:  [phone].  [facility], EMERGENCY ROOM PHONE NUMBER: [phone]  IF A COMPLICATION OR EMERGENCY SITUATION ARISES AND YOU ARE UNABLE TO REACH   YOUR PHYSICIAN - GO DIRECTLY TO THE EMERGENCY ROOM.  Chente Sutherland MD  8/18/2020 4:07:11 PM  This report has been verified and signed electronically.  [employer]

## 2020-08-18 NOTE — ANESTHESIA POSTPROCEDURE EVALUATION
Anesthesia Post Evaluation    Patient: Justa Acosta    Procedure(s) Performed: Procedure(s) (LRB):  ERCP (ENDOSCOPIC RETROGRADE CHOLANGIOPANCREATOGRAPHY) (N/A)    Final Anesthesia Type: general    Patient location during evaluation: PACU  Patient participation: Yes- Able to Participate  Level of consciousness: awake and alert and oriented  Post-procedure vital signs: reviewed and stable  Pain management: adequate  Airway patency: patent    PONV status at discharge: No PONV  Anesthetic complications: no      Cardiovascular status: blood pressure returned to baseline  Respiratory status: unassisted, spontaneous ventilation and room air  Hydration status: euvolemic  Follow-up not needed.          Vitals Value Taken Time   /76 08/18/20 1617   Temp 36.4 °C (97.5 °F) 08/18/20 1608   Pulse 74 08/18/20 1629   Resp 10 08/18/20 1629   SpO2 96 % 08/18/20 1629   Vitals shown include unvalidated device data.      No case tracking events are documented in the log.      Pain/Bridgette Score: Pain Rating Prior to Med Admin: 5 (8/18/2020 11:50 AM)

## 2020-08-18 NOTE — HPI
Ms Acosta is an 80 year old female with history of CAD, PAD s/p aorto bi-fem stent, COPD, sjogren's, DM, GERD, HTN, MVP, CCY, who is presenting from OSH due to concern for cholangitis.    She was previously seen by [facility] 12/2017 due to concern for cholangitis. Underwent EUS/ERCP notable for biliary dilatation, s/p sphincterotomy, negative balloon sweep. Follow-up ERCP (1/2018) with removal of stents, sweep with choledocholithiasis.    History provided by patient's daughter. She reports that she was at baseline yesterday and went to perform routine lab work prior to upcomming PCP appointment. Shortly thereafter she began to feel unwell with right sided abdominal pain, fevers and shakes, therefore family brought her to the hospital for additional evaluation. She was brought to OSH ([facility]) with labs notable for Tb 0.7, AST 1099, , . RUQ with intra- and extra-hep padmini dilatation, no choledocholithiasis. Due to concern for cholangitis was transferred to [facility]. LFTs normal (4/2020).    Currently she reports she is feeling better. Denies any abdominal pain (aside from chronic pain). Denies additional f/c/s. Denies nausea or vomiting. Denies any other focal complaints on review. Daughter reports in past episodes she has had worsening confusion though currently at baseline (baseline dementia).

## 2020-08-18 NOTE — ANESTHESIA PREPROCEDURE EVALUATION
"[facility]  Anesthesia Pre-Operative Evaluation         Patient Name: Justa Acosta  YOB: 1940  MRN: [id number]    SUBJECTIVE:     08/18/2020    Procedure(s) (LRB):  ERCP (ENDOSCOPIC RETROGRADE CHOLANGIOPANCREATOGRAPHY) (N/A)    Justa Acosta is a 80 y.o. female here for above procedure    Drips:     Patient Active Problem List   Diagnosis    Traumatic arthritis    DJD (degenerative joint disease)    Ankle joint pain    Postop check    Surgical wound dehiscence    Hallux rigidus    Fever    DM (diabetes mellitus)    Vascular dementia    Transaminitis    HTN (hypertension)    Chronic kidney disease, stage III (moderate)    Hypophosphatemia    Abnormal liver enzymes    Pancreatitis    History of acute cholangitis       Review of patient's allergies indicates:   Allergen Reactions    Hydromorphone Itching, Nausea And Vomiting and Other (See Comments)     Insomnia, not controlled by Benadryl    Adhesive Other (See Comments)     blister    Celebrex [celecoxib] Other (See Comments)     Burns face "like sunburn"    Celexa [citalopram] Other (See Comments)     Sweating, anxiety    Codeine Itching    Colchicine analogues Other (See Comments)     Raises blood pressure    Cymbalta [duloxetine] Itching and Other (See Comments)     Diarrhea, insomnia    Darvocet a500 [propoxyphene n-acetaminophen] Itching and Nausea And Vomiting    Effexor [venlafaxine] Nausea And Vomiting and Other (See Comments)     Headache, depression, insomnia    Felodipine Other (See Comments)     Swelling of extremities    Fentanyl Itching and Nausea And Vomiting    Gabapentin Itching and Other (See Comments)     Insomnia and severe joint pain    Hydrochlorothiazide      For Dr Olivas notes    Hydrocodone-acetaminophen Itching     Uncontrolled with Benadryl    Lasix [furosemide] Itching and Other (See Comments)     insomnia    Lexapro [escitalopram] Itching, Nausea And Vomiting " "and Other (See Comments)     "mind races", insomnia    Nubain [nalbuphine] Nausea And Vomiting    Persantine [dipyridamole] Nausea And Vomiting    Prozac [fluoxetine] Other (See Comments)     Insomnia, depression    Relafen [nabumetone] Nausea Only    Spironolactone Itching, Nausea And Vomiting and Other (See Comments)     insomnia    Tramadol Itching and Other (See Comments)     insomnia    Triamterene-hydrochlorothiazid Itching    Zoloft [sertraline] Itching and Other (See Comments)     "Blood vessels break in legs"       No current facility-administered medications on file prior to encounter.      Current Outpatient Medications on File Prior to Encounter   Medication Sig Dispense Refill    ALPRAZolam (XANAX) 0.25 MG tablet Take 0.125 mg by mouth every 12 (twelve) hours. Every morning and evening      amitriptyline (ELAVIL) 10 MG tablet Take 10 mg by mouth every evening.      amLODIPine (NORVASC) 5 MG tablet Take 5 mg by mouth every morning.       aspirin (ECOTRIN) 81 MG EC tablet Take 1 tablet (81 mg total) by mouth once daily. Avoid aspirin and nonsteroidal anti-inflammatory medicines for 2 weeks.  0    atorvastatin (LIPITOR) 40 MG tablet Take 40 mg by mouth every evening.      cetirizine (ZYRTEC) 10 MG tablet Take 10 mg by mouth every evening.      donepezil (ARICEPT) 10 MG tablet Take 10 mg by mouth every evening.      fesoterodine (TOVIAZ) 4 mg Tb24 Take 4 mg by mouth every evening.      FLUoxetine 40 MG capsule Take 40 mg by mouth every morning.       hydroxychloroquine (PLAQUENIL) 200 mg tablet Take 200 mg by mouth every morning.       irbesartan (AVAPRO) 150 MG tablet Take 150 mg by mouth once daily.       L. gasseri-B. bifidum-B longum (Equinext) 1.5 billion cell Cap Take 1 capsule by mouth once daily.      memantine (NAMENDA XR) 28 mg CSpX Take 28 mg by mouth Daily.       mirabegron (MYRBETRIQ) 50 mg Tb24 Take 50 mg by mouth once daily.      " MV-MIN/FA/D3/OM-3/DHA/EPA/FISH (CARDIAMIN ORAL) Take by mouth 2 (two) times daily.       pantoprazole (PROTONIX) 40 MG tablet Take 40 mg by mouth every morning.      pilocarpine (SALAGEN) 5 MG Tab Take 10 mg by mouth every morning.      pilocarpine (SALAGEN) 5 MG Tab Take 5 mg by mouth every evening.      SITagliptin (JANUVIA) 100 MG Tab Take 100 mg by mouth every morning.      sodium chloride 0.9% 0.9 % SolP with fentaNYL 50 mcg/mL Soln 2 mcg/mL, bupivacaine 0.5% (5 mg/ml) 0.5 % (5 mg/mL) Soln 0.125 % by Epidural route continuous. Morphine added      ursodiol (ACTIGALL) 500 MG tablet Take 750 mg by mouth every evening.          Past Surgical History:   Procedure Laterality Date    ABDOMINAL AORTA STENT      ADENOIDECTOMY      APPENDECTOMY      BACK SURGERY      removal of cyst on spine    BALLOON ANGIOPLASTY, ARTERY  1995    stents in abdomen, very near aorta, to legs    BONE RESECTION, RIB  1993    left 1rst rib, damaged left  brachial plexus per pt    BREAST SURGERY      CARDIAC CATHETERIZATION  1995    CHOLECYSTECTOMY      EYE SURGERY      cataract    FIRST RIB REMOVAL Right     FRACTURE SURGERY      HYSTERECTOMY      INCISION AND DRAINAGE FOOT  April 2013    graft to top of right foot, non-healing wound    INTRATHECAL PUMP IMPLANTATION  2011    pain pump, Bupivacaine,Morphine, Fentanyl    JOINT REPLACEMENT  Feb 2013    right ankle,total- was in [facility] post -op several days for pain control    neuro stimulator placement      OTHER SURGICAL HISTORY      removal of spinal cord stimulator    SPINE SURGERY      TIBIA FRACTURE SURGERY  1971, 72, 73, 74    Right tibia pin, bone graft, compression plate, removal of plate    TONSILLECTOMY      TOTAL ABDOMINAL HYSTERECTOMY W/ BILATERAL SALPINGOOPHORECTOMY      TRIGGER FINGER RELEASE  2011    right ring finger    VAGINAL DELIVERY      times 2       Social History     Socioeconomic History    Marital status:      Spouse name: Not on file     Number of children: Not on file    Years of education: Not on file    Highest education level: Not on file   Occupational History    Not on file   Social Needs    Financial resource strain: Not on file    Food insecurity     Worry: Not on file     Inability: Not on file    Transportation needs     Medical: Not on file     Non-medical: Not on file   Tobacco Use    Smoking status: Former Smoker    Smokeless tobacco: Never Used    Tobacco comment: 3 packs per day x 25 yrs, stopped 25 yrs ago   Substance and Sexual Activity    Alcohol use: No    Drug use: Yes     Types: Fentanyl, Morphine     Comment: intrathecal pain pump    Sexual activity: Not on file   Lifestyle    Physical activity     Days per week: Not on file     Minutes per session: Not on file    Stress: Not on file   Relationships    Social connections     Talks on phone: Not on file     Gets together: Not on file     Attends [employer] service: Not on file     Active member of club or organization: Not on file     Attends meetings of clubs or organizations: Not on file     Relationship status: Not on file   Other Topics Concern    Not on file   Social History Narrative    Not on file         OBJECTIVE:     Vital Signs Range (Last 24H):  Temp:  [36.4 °C (97.6 °F)-37.7 °C (99.9 °F)] 37.2 °C (99 °F)  Pulse:  [74-82] 74  Resp:  [16-19] 16  SpO2:  [93 %-100 %] 100 %  BP: (130-164)/(60-70) 151/69    Significant Labs:  Lab Results   Component Value Date    WBC 9.52 08/18/2020    HGB 11.6 (L) 08/18/2020    HCT 38.0 08/18/2020    PLT 94 (L) 08/18/2020     (H) 08/18/2020     (H) 08/18/2020     08/18/2020    K 4.1 08/18/2020     08/18/2020    CREATININE 0.7 08/18/2020    BUN 12 08/18/2020    CO2 33 (H) 08/18/2020    INR 1.0 08/18/2020    HGBA1C 6.9 (H) 04/13/2020       Diagnostic Studies:    EKG:   Results for orders placed or performed during the hospital encounter of 12/07/17   EKG 12-lead    Collection Time: 12/10/17  12:15 PM    Narrative    Test Reason : R00.1  Blood Pressure :  /  mmHG  Vent. Rate : 053 BPM     Atrial Rate : 053 BPM     P-R Int : 188 ms          QRS Dur : 102 ms      QT Int : 464 ms       P-R-T Axes : 064 -08 050 degrees     QTc Int : 435 ms    Sinus bradycardia  Incomplete right bundle branch block  ST and T wave abnormality, consider anterior ischemia  Abnormal ECG  When compared with ECG of 09-NOV-1989 13:17,  Vent. rate has decreased BY  35 BPM  Incomplete right bundle branch block is now Present  Confirmed by Tonny Acevedo MD ([id number]) on 12/10/2017 11:47:27 PM    Referred By: HILDA SANCHEZ           Confirmed By:Tonny Acevedo MD       2D ECHO:  TTE:  No results found for this or any previous visit.      MARGIE:  No results found for this or any previous visit.      Anesthesia Evaluation    I have reviewed the Patient Summary Reports.    I have reviewed the Nursing Notes. I have reviewed the NPO Status.   I have reviewed the Medications.     Review of Systems  Anesthesia Hx:  Hx of Anesthetic complications  History of prior surgery of interest to airway management or planning: Previous anesthesia: General   Cardiovascular:  Functional Capacity very limited / < 3 METS        Physical Exam  General:  Well nourished, Obesity    Airway/Jaw/Neck:  Airway Findings: Mouth Opening: Normal Tongue: Normal  General Airway Assessment: Adult  Mallampati: III  TM Distance: Normal, at least 6 cm  Jaw/Neck Findings:  Neck ROM: Normal ROM     Eyes/Ears/Nose:  EYES/EARS/NOSE FINDINGS: Normal   Dental:  Dental Findings: In tact   Chest/Lungs:  Chest/Lungs Findings: Clear to auscultation, Normal Respiratory Rate     Heart/Vascular:  Heart Findings: Rate: Normal  Rhythm: Regular Rhythm  Sounds: Normal  Vascular Findings: Normal    Abdomen:  Abdomen Findings:  Normal, Soft, Nontender      Skin:  Skin Findings: Normal    Mental Status:  Mental Status Findings:  Cooperative, Alert and Oriented         Anesthesia Plan  Type of  Anesthesia, risks & benefits discussed:  Anesthesia Type:  general  Patient's Preference:   Intra-op Monitoring Plan: standard ASA monitors  Intra-op Monitoring Plan Comments:   Post Op Pain Control Plan: multimodal analgesia, IV/PO Opioids PRN and per primary service following discharge from PACU  Post Op Pain Control Plan Comments:   Induction:   IV  Beta Blocker:  Patient is not currently on a Beta-Blocker (No further documentation required).       Informed Consent: Patient understands risks and agrees with Anesthesia plan.  Questions answered. Anesthesia consent signed with patient.  ASA Score: 3     Day of Surgery Review of History & Physical:    H&P update referred to the surgeon.         Ready For Surgery From Anesthesia Perspective.

## 2020-08-18 NOTE — TRANSFER OF CARE
"Anesthesia Transfer of Care Note    Patient: Justa Acosta    Procedure(s) Performed: Procedure(s) (LRB):  ERCP (ENDOSCOPIC RETROGRADE CHOLANGIOPANCREATOGRAPHY) (N/A)    Patient location: PACU    Anesthesia Type: general    Transport from OR: Transported from OR on 2-3 L/min O2 by NC with adequate spontaneous ventilation    Post pain: adequate analgesia    Post assessment: no apparent anesthetic complications    Post vital signs: stable    Level of consciousness: awake    Nausea/Vomiting: no nausea/vomiting    Complications: none    Transfer of care protocol was followed      Last vitals:   Visit Vitals  BP (!) 179/80 (BP Location: Right arm, Patient Position: Lying)   Pulse 75   Temp 36.4 °C (97.5 °F) (Temporal)   Resp 18   Ht 5' 1" (1.549 m)   Wt 74.8 kg (165 lb)   LMP  (LMP Unknown)   SpO2 100%   Breastfeeding No   BMI 31.18 kg/m²     "
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
## 2020-08-18 NOTE — PHARMACY MED REC
"Admission Medication Reconciliation - Pharmacy Consult Note    The home medication history was taken by Minesh Morrow. Based on information gathered and subsequent review by the clinical pharmacist, the items below may need attention.     You may go to "Admission" then "Reconcile Home Medications" tabs to review and/or act upon these items.     Potentially problematic discrepancies with current MAR  o Patient IS taking the following which was not ordered upon admit  o Donepezil 10 mg PO every evening   o Hydroxychloroquine 200 mg PO everyday   o Irbesartan 150 mg PO everyday   o Amlodipine 5 mg PO everyday   o memantine XR 28 mg PO everyday   o Mirabegron 50 mg PO everyday   o Ursodiol 750 mg PO at bedtime   o Pantoprazole 40 mg PO everyday   o Atorvastatin 40 mg PO at bedtime     o Patient is taking a drug DIFFERENTLY than how ordered upon admit  o Fluoxetine 40 mg PO everyday     Please address this information as you see fit.  Feel free to contact us if you have any questions or require assistance.    Brendon Mccracken   [id number]                  .    .          "

## 2020-08-19 VITALS
HEART RATE: 70 BPM | OXYGEN SATURATION: 99 % | SYSTOLIC BLOOD PRESSURE: 164 MMHG | HEIGHT: 61 IN | WEIGHT: 165 LBS | RESPIRATION RATE: 18 BRPM | DIASTOLIC BLOOD PRESSURE: 71 MMHG | TEMPERATURE: 97 F | BODY MASS INDEX: 31.15 KG/M2

## 2020-08-19 LAB
ALBUMIN SERPL BCP-MCNC: 2.9 G/DL (ref 3.5–5.2)
ALP SERPL-CCNC: 220 U/L (ref 55–135)
ALT SERPL W/O P-5'-P-CCNC: 321 U/L (ref 10–44)
ANION GAP SERPL CALC-SCNC: 6 MMOL/L (ref 8–16)
AST SERPL-CCNC: 155 U/L (ref 10–40)
BASOPHILS # BLD AUTO: 0.02 K/UL (ref 0–0.2)
BASOPHILS NFR BLD: 0.2 % (ref 0–1.9)
BILIRUB SERPL-MCNC: 0.7 MG/DL (ref 0.1–1)
BUN SERPL-MCNC: 14 MG/DL (ref 8–23)
CALCIUM SERPL-MCNC: 8.5 MG/DL (ref 8.7–10.5)
CHLORIDE SERPL-SCNC: 103 MMOL/L (ref 95–110)
CO2 SERPL-SCNC: 30 MMOL/L (ref 23–29)
CREAT SERPL-MCNC: 0.7 MG/DL (ref 0.5–1.4)
DIFFERENTIAL METHOD: ABNORMAL
EOSINOPHIL # BLD AUTO: 0.2 K/UL (ref 0–0.5)
EOSINOPHIL NFR BLD: 2 % (ref 0–8)
ERYTHROCYTE [DISTWIDTH] IN BLOOD BY AUTOMATED COUNT: 13.5 % (ref 11.5–14.5)
EST. GFR  (AFRICAN AMERICAN): >60 ML/MIN/1.73 M^2
EST. GFR  (NON AFRICAN AMERICAN): >60 ML/MIN/1.73 M^2
GLUCOSE SERPL-MCNC: 102 MG/DL (ref 70–110)
HCT VFR BLD AUTO: 36.9 % (ref 37–48.5)
HGB BLD-MCNC: 11.1 G/DL (ref 12–16)
IMM GRANULOCYTES # BLD AUTO: 0.03 K/UL (ref 0–0.04)
IMM GRANULOCYTES NFR BLD AUTO: 0.3 % (ref 0–0.5)
LYMPHOCYTES # BLD AUTO: 0.5 K/UL (ref 1–4.8)
LYMPHOCYTES NFR BLD: 5.6 % (ref 18–48)
MAGNESIUM SERPL-MCNC: 1.9 MG/DL (ref 1.6–2.6)
MCH RBC QN AUTO: 28.4 PG (ref 27–31)
MCHC RBC AUTO-ENTMCNC: 30.1 G/DL (ref 32–36)
MCV RBC AUTO: 94 FL (ref 82–98)
MONOCYTES # BLD AUTO: 0.6 K/UL (ref 0.3–1)
MONOCYTES NFR BLD: 6.6 % (ref 4–15)
NEUTROPHILS # BLD AUTO: 7.7 K/UL (ref 1.8–7.7)
NEUTROPHILS NFR BLD: 85.3 % (ref 38–73)
NRBC BLD-RTO: 0 /100 WBC
PHOSPHATE SERPL-MCNC: 2.7 MG/DL (ref 2.7–4.5)
PLATELET # BLD AUTO: 94 K/UL (ref 150–350)
PMV BLD AUTO: 11.2 FL (ref 9.2–12.9)
POCT GLUCOSE: 103 MG/DL (ref 70–110)
POCT GLUCOSE: 112 MG/DL (ref 70–110)
POTASSIUM SERPL-SCNC: 3.3 MMOL/L (ref 3.5–5.1)
PROT SERPL-MCNC: 6.2 G/DL (ref 6–8.4)
RBC # BLD AUTO: 3.91 M/UL (ref 4–5.4)
SARS-COV-2 RNA RESP QL NAA+PROBE: NOT DETECTED
SODIUM SERPL-SCNC: 139 MMOL/L (ref 136–145)
WBC # BLD AUTO: 9.07 K/UL (ref 3.9–12.7)

## 2020-08-19 PROCEDURE — 85025 COMPLETE CBC W/AUTO DIFF WBC: CPT

## 2020-08-19 PROCEDURE — 84100 ASSAY OF PHOSPHORUS: CPT

## 2020-08-19 PROCEDURE — 36415 COLL VENOUS BLD VENIPUNCTURE: CPT

## 2020-08-19 PROCEDURE — 97530 THERAPEUTIC ACTIVITIES: CPT

## 2020-08-19 PROCEDURE — 83735 ASSAY OF MAGNESIUM: CPT

## 2020-08-19 PROCEDURE — 25000003 PHARM REV CODE 250: Performed by: STUDENT IN AN ORGANIZED HEALTH CARE EDUCATION/TRAINING PROGRAM

## 2020-08-19 PROCEDURE — 99239 HOSP IP/OBS DSCHRG MGMT >30: CPT | Mod: GC,,, | Performed by: INTERNAL MEDICINE

## 2020-08-19 PROCEDURE — 97165 OT EVAL LOW COMPLEX 30 MIN: CPT

## 2020-08-19 PROCEDURE — 80053 COMPREHEN METABOLIC PANEL: CPT

## 2020-08-19 PROCEDURE — 63600175 PHARM REV CODE 636 W HCPCS: Performed by: STUDENT IN AN ORGANIZED HEALTH CARE EDUCATION/TRAINING PROGRAM

## 2020-08-19 PROCEDURE — 99239 PR HOSPITAL DISCHARGE DAY,>30 MIN: ICD-10-PCS | Mod: GC,,, | Performed by: INTERNAL MEDICINE

## 2020-08-19 PROCEDURE — 97161 PT EVAL LOW COMPLEX 20 MIN: CPT

## 2020-08-19 RX ORDER — METRONIDAZOLE 500 MG/1
500 TABLET ORAL EVERY 8 HOURS
Status: CANCELLED | OUTPATIENT
Start: 2020-08-19

## 2020-08-19 RX ORDER — METRONIDAZOLE 500 MG/1
500 TABLET ORAL EVERY 8 HOURS
Qty: 21 TABLET | Refills: 0 | Status: SHIPPED | OUTPATIENT
Start: 2020-08-19 | End: 2020-08-26

## 2020-08-19 RX ORDER — POTASSIUM CHLORIDE 1.5 G/1.58G
20 POWDER, FOR SOLUTION ORAL ONCE
Status: COMPLETED | OUTPATIENT
Start: 2020-08-19 | End: 2020-08-19

## 2020-08-19 RX ORDER — CIPROFLOXACIN 500 MG/1
500 TABLET ORAL EVERY 12 HOURS
Qty: 14 TABLET | Refills: 0 | Status: SHIPPED | OUTPATIENT
Start: 2020-08-19 | End: 2020-08-26

## 2020-08-19 RX ORDER — CIPROFLOXACIN 500 MG/1
500 TABLET ORAL EVERY 12 HOURS
Status: CANCELLED | OUTPATIENT
Start: 2020-08-19

## 2020-08-19 RX ADMIN — PIPERACILLIN SODIUM AND TAZOBACTAM SODIUM 4.5 G: 4; .5 INJECTION, POWDER, LYOPHILIZED, FOR SOLUTION INTRAVENOUS at 01:08

## 2020-08-19 RX ADMIN — POLYETHYLENE GLYCOL 3350 17 G: 17 POWDER, FOR SOLUTION ORAL at 09:08

## 2020-08-19 RX ADMIN — PROMETHAZINE HYDROCHLORIDE 6.25 MG: 25 INJECTION INTRAMUSCULAR; INTRAVENOUS at 09:08

## 2020-08-19 RX ADMIN — PILOCARPINE HYDROCHLORIDE 10 MG: 5 TABLET, FILM COATED ORAL at 06:08

## 2020-08-19 RX ADMIN — PIPERACILLIN SODIUM AND TAZOBACTAM SODIUM 4.5 G: 4; .5 INJECTION, POWDER, LYOPHILIZED, FOR SOLUTION INTRAVENOUS at 09:08

## 2020-08-19 RX ADMIN — ALPRAZOLAM 0.12 MG: 0.25 TABLET ORAL at 09:08

## 2020-08-19 RX ADMIN — POTASSIUM CHLORIDE 20 MEQ: 1.5 POWDER, FOR SOLUTION ORAL at 09:08
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
## 2020-08-19 NOTE — PLAN OF CARE
SW faxed HH Orders to [facility] via  for review. JASON will continue to follow.        08/19/20 3220   Post-Acute Status   Post-Acute Authorization Home Health   Home Health Status Referrals Sent       Rose Saxena LMSW   - [facility]  Ext. [phone]

## 2020-08-19 NOTE — HOSPITAL COURSE
Justa Acosta is an 80 year old female with history of coronary artery disease, type 2 diabetes mellitus, dementia, PAD s/p aorto bi-fem stent, GERD, HTN, MVP, sjogren disease, cholecystectomy with cholangitis who is s/p ERCP with sphincterotomy and stent placement. Patient on zosyn during admission, discharged on ciprofloxacin and flagyl. Plan for follow up ERCP in 3-4 weeks, [facility] will schedule.

## 2020-08-19 NOTE — PLAN OF CARE
Problem: Occupational Therapy Goal  Goal: Occupational Therapy Goal  Description: Goals to be met by: 8/29     Patient will increase functional independence with ADLs by performing:    UE Dressing with Supervision.  LE Dressing with Stand-by Assistance.  Grooming while standing with Supervision.  Toileting from toilet with Stand-by Assistance for hygiene and clothing management.   Supine to sit with Supervision.  Step transfer with Supervision using RW    Outcome: Ongoing, Progressing     OT eval completed.

## 2020-08-19 NOTE — PLAN OF CARE
Problem: Physical Therapy Goal  Goal: Physical Therapy Goal  Description: Goals to be met by: 20     Patient will increase functional independence with mobility by performin. Supine to sit with MInimal Assistance  2. Sit to supine with MInimal Assistance  3. Sit to stand transfer with Moderate Assistance  4. Gait  x 10 feet with Moderate Assistance using Rolling Walker.     Outcome: Ongoing, Progressing   Initial evaluation completed. Patient tolerated assessment well. Established POC and goals. Patient would continue to benefit from skilled PT services in order to improve functional mobility independence.   Viri Melgar, PT, DPT  2020

## 2020-08-19 NOTE — TREATMENT PLAN
[facility] Treatment Plan    Justa Acosta is a 80 y.o. female admitted to hospital 8/18/2020 (Hospital Day: 2) due to Transaminitis.     Interval History  - reports she is feeling well today without any complaints  - denies abd pain, n/v  - afebrile, no leukocytosis  - s/p ERCP on 8/18 with choledocholithiasis, partially removed, stent placed  - LFTs continuing to improve    Objective  Temp:  [97 °F (36.1 °C)-99 °F (37.2 °C)] 98 °F (36.7 °C) (08/19 0723)  Pulse:  [66-78] 78 (08/19 0723)  BP: (145-179)/(64-80) 152/66 (08/19 0723)  Resp:  [10-18] 18 (08/19 0723)  SpO2:  [92 %-100 %] 98 % (08/19 0723)    General: Alert, Oriented x3, no distress  Abdomen: Non-distended. Normal tympany. Soft. Non-tender. No peritoneal signs.    Laboratory  Lab Results   Component Value Date    WBC 9.07 08/19/2020    HGB 11.1 (L) 08/19/2020    HCT 36.9 (L) 08/19/2020    MCV 94 08/19/2020    PLT 94 (L) 08/19/2020       Lab Results   Component Value Date     (H) 08/19/2020     (H) 08/19/2020    ALKPHOS 220 (H) 08/19/2020    BILITOT 0.7 08/19/2020       Plan  - complete 1 week abx for cholangitis  - repeat ERCP as outpatient  - diet as tolerated  - Plan of care was discussed with primary team.  - We are signing-off. Please call with any questions.    Thank you for involving us in the care of Justa Acosta. Please call with any additional questions, concerns or changes in the patient's clinical status.    Jacob Hurtado MD  Gastroenterology Fellow

## 2020-08-19 NOTE — DISCHARGE SUMMARY
[facility] Hospital Medicine  Discharge Summary      Patient Name: Justa Acosta  MRN: [id number]  Admission Date: 8/18/2020  Hospital Length of Stay: 1 days  Discharge Date and Time:  08/19/2020 5:25 PM  Attending Physician: Shai Gaviria MD   Discharging Provider: Milan Villanueva MD  Primary Care Provider: Yordy Collier MD  Hospital Medicine Team: [facility] HOSP MED 1 Milan Villanueva MD    HPI:   Justa Acosta is an 80 year old female with history of coronary artery disease, type 2 diabetes mellitus, dementia, PAD s/p aorto bi-fem stent, GERD, HTN, MVP, sjogren disease, cholecystectomy who presents with fever and abdominal pain at OSH. Patient states she had a 1 day history of fever measure at 100.8 with associated chills. Patient has also had 1 day history of dull 4-5/10 right abdominal pain that is non radiating. She describes being nauseous and having 1-2 episodes of bilious nonbloody vomiting. She also describes a 1-2 day history of loose nonbloody bowel movements.     At [facility], patient was found to have elevated liver enzymes (AST 1099, , ), total bilirubin of 0.7, and lactate 2.2. Right upper quadrant ultrasound with consistent with intra and extrahepatic biliary dilatation, and dilatation of the common bile duct without evidence of gall stones. Patient was given vancomycin and zosyn at OSH, transferred to [facility] for advanced endoscopy services for concerns of cholangitis.     Patient has history of cholangitis and underwent ERCP in 12/2017 with subsequent sphincterotomy and stent placement (removed 1/2018). Patient on ursodiol.      Procedure(s) (LRB):  ERCP (ENDOSCOPIC RETROGRADE CHOLANGIOPANCREATOGRAPHY) (N/A)      Hospital Course:   Justa Acosta is an 80 year old female with history of coronary artery disease, type 2 diabetes mellitus, dementia, PAD s/p aorto bi-fem stent, GERD, HTN, MVP, sjogren disease, cholecystectomy with cholangitis who is  s/p ERCP with sphincterotomy and stent placement. Patient on zosyn during admission, discharged on ciprofloxacin and flagyl. Plan for follow up ERCP in 3-4 weeks, [facility] will schedule.     Review of Systems   Constitutional: Negative for activity change, appetite change, chills, diaphoresis, fatigue, fever and unexpected weight change.   HENT: Negative for sore throat and trouble swallowing.    Eyes: Negative for visual disturbance.   Respiratory: Negative for chest tightness and shortness of breath.    Cardiovascular: Negative for chest pain and leg swelling.   Gastrointestinal: Positive for abdominal pain (resolved). Negative for abdominal distention, anal bleeding, blood in stool, constipation, diarrhea, nausea and vomiting.   Genitourinary: Negative for dysuria and hematuria.   Musculoskeletal: Negative for arthralgias and myalgias.   Skin: Negative for rash.   Neurological: Positive for headaches. Negative for dizziness, weakness and light-headedness.   Psychiatric/Behavioral: Negative for agitation and confusion.      Objective:      Vitals:    08/18/20 2315 08/19/20 0428 08/19/20 0723 08/19/20 1111   BP: (!) 152/69 (!) 149/68 (!) 152/66 (!) 164/71   BP Location: Right arm Right arm Right arm Left arm   Patient Position: Lying Lying Lying Lying   Pulse: 66 73 78 70   Resp: 18 18 18 18   Temp: 98 °F (36.7 °C) 98.5 °F (36.9 °C) 98 °F (36.7 °C) 96.8 °F (36 °C)   TempSrc: Oral Oral Oral Oral   SpO2: 97% 98% 98% 99%   Weight:       Height:         Weight: 74.8 kg (165 lb)  Body mass index is 31.18 kg/m².     Physical Exam  Vitals signs and nursing note reviewed.   Constitutional:       General: She is not in acute distress.     Appearance: She is well-developed. She is not ill-appearing, toxic-appearing or diaphoretic.   HENT:      Head: Normocephalic and atraumatic.      Mouth/Throat:      Mouth: Mucous membranes are dry.      Pharynx: Oropharynx is clear.   Eyes:      General: No scleral icterus.      Conjunctiva/sclera: Conjunctivae normal.   Cardiovascular:      Rate and Rhythm: Normal rate and regular rhythm.      Heart sounds: Normal heart sounds.   Pulmonary:      Effort: Pulmonary effort is normal. No respiratory distress.      Breath sounds: Normal breath sounds.   Abdominal:      General: Abdomen is flat. Bowel sounds are normal. There is no distension.      Palpations: Abdomen is soft. There is no mass.      Tenderness: There is abdominal tenderness. There is no guarding or rebound.      Comments: Tenderness with deep palpation of RUQ   Musculoskeletal:         General: No tenderness.   Lymphadenopathy:      Cervical: No cervical adenopathy.   Skin:     General: Skin is warm.      Capillary Refill: Capillary refill takes less than 2 seconds.      Findings: No rash.   Neurological:      Mental Status: She is alert and oriented to person, place, and time.   Psychiatric:         Behavior: Behavior normal.         Thought Content: Thought content normal.         Judgment: Judgment normal.     Consults:   Consults (From admission, onward)        Status Ordering Provider     Inpatient consult to [facility]  Once     Provider:  (Not yet assigned)    Completed REBA REDDING          * Transaminitis  See cholangitis        HTN (hypertension)  On irbesartan and amlodipine at home  Plan:  - Restart antihypertensives    Hyperlipidemia  Plan:  - Continue home atorvastatin      PVD (peripheral vascular disease)  Patient with aorto bi-fem stents  Plan:  - Continue aspirin    Dementia without behavioral disturbance  Patient with known dementia at baseline. Per daughter, patient requires fluoxetine, amitriptyline, and xanax otherwise she may have delirium/mental status changes overnight.  Plan:  - Continue home memantine and donepezil  - Continue home fluoxetine, amitriptyline, and xanax      Cholangitis  Justa Acosta is an 80 year old female with history of coronary artery disease, type 2  diabetes mellitus, dementia, PAD s/p aorto bi-fem stent, GERD, HTN, MVP, sjogren disease, cholecystectomy who presents with fever and abdominal pain at OSH.   Patient was found to have elevated liver enzymes (AST 1099, , ), total bilirubin of 0.7, and lactate 2.2. Right upper quadrant ultrasound with consistent with cholangitis showing intra and extrahepatic biliary dilatation, and dilatation of the common bile duct without evidence of gall stones. Transferred to [facility] to ERCP. Patient with history of cholangitis s/p sphincterotomy and stent placement (12/2018). Patient on ursodiol.    S/p ERCP on 8/18 with sphincterotomy and stent placement    Plan:  - Follow up with [facility] for ERCP in 3-4 weeks  - PO ciprofloxacin and flagyl for 7 days upon discharge    Vascular dementia  See dementia without behavioral disturbance      DM (diabetes mellitus)  Previous HbA1c 6.9% (4/2020) on Januvia at home  Plan:  - Restart januvia at home        Final Active Diagnoses:    Diagnosis Date Noted POA    PRINCIPAL PROBLEM:  Transaminitis [R74.0] 12/08/2017 Yes    HTN (hypertension) [I10] 12/08/2017 Yes    Dementia without behavioral disturbance [F03.90] 08/18/2020 Unknown    Cholangitis [K83.09] 08/18/2020 Yes    PVD (peripheral vascular disease) [I73.9]  Unknown    Hyperlipidemia [E78.5]  Unknown    History of acute cholangitis [Z87.19] 01/04/2018 Not Applicable    DM (diabetes mellitus) [E11.9] 12/08/2017 Yes    Vascular dementia [F01.50] 12/08/2017 Yes      Problems Resolved During this Admission:       Discharged Condition: fair    Disposition: Home or Self Care    Follow Up:  Follow-up Information     Yordy Collier MD On 8/26/2020.    Specialty: Family Medicine  Why: at 10:00 AM.  Contact information:  [address] [place] [address]  [address] [place] [address]  [phone]                 Patient Instructions:   No discharge procedures on file.    Significant Diagnostic Studies: Labs: All labs within the past 24  hours have been reviewed    Pending Diagnostic Studies:     None         Medications:  Reconciled Home Medications:      Medication List      START taking these medications    ciprofloxacin HCl 500 MG tablet  Commonly known as: CIPRO  Take 1 tablet (500 mg total) by mouth every 12 (twelve) hours. for 7 days     metroNIDAZOLE 500 MG tablet  Commonly known as: FLAGYL  Take 1 tablet (500 mg total) by mouth every 8 (eight) hours. for 7 days        CHANGE how you take these medications    ALPRAZolam 0.25 MG tablet  Commonly known as: XANAX  Take 0.125 mg by mouth every 12 (twelve) hours. Every morning and evening  What changed: Another medication with the same name was removed. Continue taking this medication, and follow the directions you see here.     mirabegron 50 mg Tb24  Commonly known as: MYRBETRIQ  Take 50 mg by mouth once daily.  What changed: Another medication with the same name was removed. Continue taking this medication, and follow the directions you see here.        CONTINUE taking these medications    amitriptyline 10 MG tablet  Commonly known as: ELAVIL  Take 10 mg by mouth every evening.     amLODIPine 5 MG tablet  Commonly known as: NORVASC  Take 5 mg by mouth every morning.     aspirin 81 MG EC tablet  Commonly known as: ECOTRIN  Take 1 tablet (81 mg total) by mouth once daily. Avoid aspirin and nonsteroidal anti-inflammatory medicines for 2 weeks.     atorvastatin 40 MG tablet  Commonly known as: LIPITOR  Take 40 mg by mouth every evening.     CARDIAMIN ORAL  Take by mouth 2 (two) times daily.     cetirizine 10 MG tablet  Commonly known as: ZYRTEC  Take 10 mg by mouth every evening.     donepeziL 10 MG tablet  Commonly known as: ARICEPT  Take 10 mg by mouth every evening.     FLUoxetine 40 MG capsule  Take 40 mg by mouth every morning.     hydrOXYchloroQUINE 200 mg tablet  Commonly known as: PLAQUENIL  Take 200 mg by mouth every morning.     irbesartan 150 MG tablet  Commonly known as: AVAPRO  Take  150 mg by mouth once daily.     NAMENDA XR 28 mg Cspx  Generic drug: memantine  Take 28 mg by mouth Daily.     pantoprazole 40 MG tablet  Commonly known as: PROTONIX  Take 40 mg by mouth every morning.     GID Group 1.5 billion cell Cap  Generic drug: L. gasseri-B. bifidum-B longum  Take 1 capsule by mouth once daily.     * pilocarpine 5 MG Tab  Commonly known as: SALAGEN  Take 10 mg by mouth every morning.     * pilocarpine 5 MG Tab  Commonly known as: SALAGEN  Take 5 mg by mouth every evening.     SITagliptin 100 MG Tab  Commonly known as: JANUVIA  Take 100 mg by mouth every morning.     sodium chloride 0.9% 0.9 % SolP with fentaNYL 50 mcg/mL Soln 2 mcg/mL, bupivacaine 0.5% (5 mg/ml) 0.5 % (5 mg/mL) Soln 0.125 %  by Epidural route continuous. Morphine added     TOVIAZ 4 mg Tb24  Generic drug: fesoterodine  Take 4 mg by mouth every evening.     ursodioL 500 MG tablet  Commonly known as: ACTIGALL  Take 750 mg by mouth every evening.         * This list has 2 medication(s) that are the same as other medications prescribed for you. Read the directions carefully, and ask your doctor or other care provider to review them with you.            STOP taking these medications    bisoprolol 5 MG tablet  Commonly known as: ZEBETA     lisinopriL 5 MG tablet  Commonly known as: PRINIVIL,ZESTRIL     ondansetron 8 MG Tbdl  Commonly known as: ZOFRAN-ODT     tolterodine 4 MG 24 hr capsule  Commonly known as: DETROL LA            Indwelling Lines/Drains at time of discharge:   Lines/Drains/Airways     Epidural Line                 Perineural Analgesia/Anesthesia Assessment (using dermatomes) Epidural 02/20/13 0645 2737 days                Time spent on the discharge of patient: 35 minutes  Patient was seen and examined on the date of discharge and determined to be suitable for discharge.         Milan Villanueva MD  Department of Hospital Medicine  [facility]

## 2020-08-19 NOTE — PLAN OF CARE
"[facility]    HOME HEALTH ORDERS  FACE TO FACE ENCOUNTER    Patient Name: Justa Acosta  YOB: 1940    PCP: Yordy Collier MD   PCP Address: [address] [place] [address] [address] [facility] [address]  PCP Phone Number: [phone]  PCP Fax: [phone]    Encounter Date: 08/19/2020    Admit to Home Health    Diagnoses:  Active Hospital Problems    Diagnosis  POA    *Transaminitis [R74.0]  Yes    Dementia without behavioral disturbance [F03.90]  Unknown    Cholangitis [K83.09]  Yes    PVD (peripheral vascular disease) [I73.9]  Unknown    Hyperlipidemia [E78.5]  Unknown    History of acute cholangitis [Z87.19]  Not Applicable    DM (diabetes mellitus) [E11.9]  Yes    HTN (hypertension) [I10]  Yes    Vascular dementia [F01.50]  Yes      Resolved Hospital Problems   No resolved problems to display.       No future appointments.  Follow-up Information     Yordy Collier MD On 8/26/2020.    Specialty: Family Medicine  Why: at 10:00 AM.  Contact information:  [address] [place] [id number]  [phone]                     I have seen and examined this patient face to face today. My clinical findings that support the need for the home health skilled services and home bound status are the following:  Weakness/numbness causing balance and gait disturbance due to Infection, Weakness/Debility and Anemia making it taxing to leave home.    Allergies:  Review of patient's allergies indicates:   Allergen Reactions    Hydromorphone Itching, Nausea And Vomiting and Other (See Comments)     Insomnia, not controlled by Benadryl    Adhesive Other (See Comments)     blister    Celebrex [celecoxib] Other (See Comments)     Burns face "like sunburn"    Celexa [citalopram] Other (See Comments)     Sweating, anxiety    Codeine Itching    Colchicine analogues Other (See Comments)     Raises blood pressure    Cymbalta [duloxetine] Itching and Other (See Comments)     " "Diarrhea, insomnia    Darvocet a500 [propoxyphene n-acetaminophen] Itching and Nausea And Vomiting    Effexor [venlafaxine] Nausea And Vomiting and Other (See Comments)     Headache, depression, insomnia    Felodipine Other (See Comments)     Swelling of extremities    Fentanyl Itching and Nausea And Vomiting    Gabapentin Itching and Other (See Comments)     Insomnia and severe joint pain    Hydrochlorothiazide      For Dr Olivas notes    Hydrocodone-acetaminophen Itching     Uncontrolled with Benadryl    Lasix [furosemide] Itching and Other (See Comments)     insomnia    Lexapro [escitalopram] Itching, Nausea And Vomiting and Other (See Comments)     "mind races", insomnia    Nubain [nalbuphine] Nausea And Vomiting    Persantine [dipyridamole] Nausea And Vomiting    Relafen [nabumetone] Nausea Only    Spironolactone Itching, Nausea And Vomiting and Other (See Comments)     insomnia    Tramadol Itching and Other (See Comments)     insomnia    Triamterene-hydrochlorothiazid Itching    Zoloft [sertraline] Itching and Other (See Comments)     "Blood vessels break in legs"       Diet: regular diet    Activities: activity as tolerated    Nursing:   SN to complete comprehensive assessment including routine vital signs. Instruct on disease process and s/s of complications to report to MD. Review/verify medication list sent home with the patient at time of discharge  and instruct patient/caregiver as needed. Frequency may be adjusted depending on start of care date.    CONSULTS:    Physical Therapy to evaluate and treat. Evaluate for home safety and equipment needs; Establish/upgrade home exercise program. Perform / instruct on therapeutic exercises, gait training, transfer training, and Range of Motion.  Occupational Therapy to evaluate and treat. Evaluate home environment for safety and equipment needs. Perform/Instruct on transfers, ADL training, ROM, and therapeutic exercises.    Medications: Review " discharge medications with patient and family and provide education.      Current Discharge Medication List      START taking these medications    Details   ciprofloxacin HCl (CIPRO) 500 MG tablet Take 1 tablet (500 mg total) by mouth every 12 (twelve) hours. for 7 days  Qty: 14 tablet, Refills: 0      metroNIDAZOLE (FLAGYL) 500 MG tablet Take 1 tablet (500 mg total) by mouth every 8 (eight) hours. for 7 days  Qty: 21 tablet, Refills: 0         CONTINUE these medications which have NOT CHANGED    Details   ALPRAZolam (XANAX) 0.25 MG tablet Take 0.125 mg by mouth every 12 (twelve) hours. Every morning and evening      amitriptyline (ELAVIL) 10 MG tablet Take 10 mg by mouth every evening.      amLODIPine (NORVASC) 5 MG tablet Take 5 mg by mouth every morning.       aspirin (ECOTRIN) 81 MG EC tablet Take 1 tablet (81 mg total) by mouth once daily. Avoid aspirin and nonsteroidal anti-inflammatory medicines for 2 weeks.  Refills: 0      atorvastatin (LIPITOR) 40 MG tablet Take 40 mg by mouth every evening.      cetirizine (ZYRTEC) 10 MG tablet Take 10 mg by mouth every evening.      donepezil (ARICEPT) 10 MG tablet Take 10 mg by mouth every evening.      fesoterodine (TOVIAZ) 4 mg Tb24 Take 4 mg by mouth every evening.      FLUoxetine 40 MG capsule Take 40 mg by mouth every morning.       hydroxychloroquine (PLAQUENIL) 200 mg tablet Take 200 mg by mouth every morning.       irbesartan (AVAPRO) 150 MG tablet Take 150 mg by mouth once daily.       L. gasseri-B. bifidum-B longum ([facility] [employer]) 1.5 billion cell Cap Take 1 capsule by mouth once daily.      memantine (NAMENDA XR) 28 mg CSpX Take 28 mg by mouth Daily.       mirabegron (MYRBETRIQ) 50 mg Tb24 Take 50 mg by mouth once daily.      MV-MIN/FA/D3/OM-3/DHA/EPA/FISH (CARDIAMIN ORAL) Take by mouth 2 (two) times daily.       pantoprazole (PROTONIX) 40 MG tablet Take 40 mg by mouth every morning.      !! pilocarpine (SALAGEN) 5 MG Tab Take 10 mg by mouth every  morning.      !! pilocarpine (SALAGEN) 5 MG Tab Take 5 mg by mouth every evening.      SITagliptin (JANUVIA) 100 MG Tab Take 100 mg by mouth every morning.      sodium chloride 0.9% 0.9 % SolP with fentaNYL 50 mcg/mL Soln 2 mcg/mL, bupivacaine 0.5% (5 mg/ml) 0.5 % (5 mg/mL) Soln 0.125 % by Epidural route continuous. Morphine added      ursodiol (ACTIGALL) 500 MG tablet Take 750 mg by mouth every evening.        !! - Potential duplicate medications found. Please discuss with provider.      STOP taking these medications       tolterodine (DETROL LA) 4 MG 24 hr capsule Comments:   Reason for Stopping:         bisoprolol (ZEBETA) 5 MG tablet Comments:   Reason for Stopping:         lisinopril (PRINIVIL,ZESTRIL) 5 MG tablet Comments:   Reason for Stopping:         ondansetron (ZOFRAN-ODT) 8 MG TbDL Comments:   Reason for Stopping:               I certify that this patient is confined to her home and needs physical therapy and occupational therapy.
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
## 2020-08-20 ENCOUNTER — TELEPHONE (OUTPATIENT)
Dept: ENDOSCOPY | Facility: HOSPITAL | Age: 80
End: 2020-08-20

## 2020-08-20 ENCOUNTER — PATIENT OUTREACH (OUTPATIENT)
Dept: ADMINISTRATIVE | Facility: CLINIC | Age: 80
End: 2020-08-20

## 2020-08-20 DIAGNOSIS — K80.50 COMMON BILE DUCT STONE: Primary | ICD-10-CM

## 2020-08-20 NOTE — PATIENT INSTRUCTIONS
Discharge Instructions for ERCP (Endoscopic Retrograde Cholangiopancreatography)  You had a procedure known as an ERCP. Your healthcare provider performed the ERCP to look at your bile or pancreatic ducts, and to locate and treat blockages in the ducts. This procedure is used to diagnose diseases of the pancreas, bile ducts, and pancreatic duct, liver, and gallbladder. Heres what you need to do following your ERCP.  Home care  · Dont take aspirin or any other blood-thinning medicines (anticoagulants) until your provider says its OK.  · Your provider may prescribe an antibiotic, depending on what was done during the ERCP.  · You may have a sore throat for 1 to 2 days after the procedure. Use lozenges or gargle with salt water for your sore throat. If you're not better in a few days, call your provider.  · Rest, drink fluids, and eat light meals. If you feel bloated or have too much gas, use a heating pad on your belly to help reduce the discomfort. This should help you feel better. But if it doesn't, call your provider.  · Dont drink alcohol for 2 days after the procedure.  Follow-up care  Make a follow-up appointment as directed by our staff.     When to seek medical care  Call your provider right away if you have any of the following:  · Trouble swallowing or throat pain that gets worse   · Chest pain or severe belly or abdominal pain  · Fever above 100°F (37.7°C) or chills  · Upset stomach (nausea) and vomiting  · Black or tarry stools     © 7913-3417 [employer]. [address] [place] [address], [place] [address]. All rights reserved. This information is not intended as a substitute for professional medical care. Always follow your healthcare professional's instructions.

## 2020-08-20 NOTE — PLAN OF CARE
Patient discharged home with [facility].    Follow up made with Dr. Payan for 8/26/2020 at 10:00 AM.       08/20/20 0720   Final Note   Assessment Type Final Discharge Note   Anticipated Discharge Disposition Home-Health   Right Care Referral Info   Post Acute Recommendation Home-care   Referral Type Home Health   Facility Name [facility]

## 2020-08-24 ENCOUNTER — TELEPHONE (OUTPATIENT)
Dept: ENDOSCOPY | Facility: HOSPITAL | Age: 80
End: 2020-08-24

## 2020-08-24 NOTE — TELEPHONE ENCOUNTER
Spoke with . ERCP scheduled for 9/15 at 10a. Reviewed prep instructions. Mr Acosta verbalized understanding.

## 2020-09-03 ENCOUNTER — TELEPHONE (OUTPATIENT)
Dept: ENDOSCOPY | Facility: HOSPITAL | Age: 80
End: 2020-09-03

## 2020-09-03 DIAGNOSIS — K83.1 BILIARY OBSTRUCTION: ICD-10-CM

## 2020-09-03 NOTE — TELEPHONE ENCOUNTER
Spoke with patient about instructions for ERCP scheduled 9/15/20 at 1000 and covid-19 test 9/12/20 at 1130 at [facility] urgent [facility].  Instructions emailed and mailed.

## 2020-09-12 ENCOUNTER — LAB VISIT (OUTPATIENT)
Dept: URGENT CARE | Facility: CLINIC | Age: 80
End: 2020-09-12
Payer: MEDICARE

## 2020-09-12 DIAGNOSIS — K83.1 BILIARY OBSTRUCTION: ICD-10-CM

## 2020-09-12 PROCEDURE — U0003 INFECTIOUS AGENT DETECTION BY NUCLEIC ACID (DNA OR RNA); SEVERE ACUTE RESPIRATORY SYNDROME CORONAVIRUS 2 (SARS-COV-2) (CORONAVIRUS DISEASE [COVID-19]), AMPLIFIED PROBE TECHNIQUE, MAKING USE OF HIGH THROUGHPUT TECHNOLOGIES AS DESCRIBED BY CMS-2020-01-R: HCPCS

## 2020-09-12 PROCEDURE — 99211 PR OFFICE/OUTPT VISIT, EST, LEVL I: ICD-10-PCS | Mod: S$GLB,,, | Performed by: NURSE PRACTITIONER

## 2020-09-12 PROCEDURE — 99211 OFF/OP EST MAY X REQ PHY/QHP: CPT | Mod: S$GLB,,, | Performed by: NURSE PRACTITIONER

## 2020-09-13 LAB — SARS-COV-2 RNA RESP QL NAA+PROBE: NOT DETECTED

## 2020-09-14 ENCOUNTER — TELEPHONE (OUTPATIENT)
Dept: ENDOSCOPY | Facility: HOSPITAL | Age: 80
End: 2020-09-14

## 2020-09-14 DIAGNOSIS — K83.1 BILIARY OBSTRUCTION: ICD-10-CM

## 2020-09-14 NOTE — TELEPHONE ENCOUNTER
Patient's daughter, Dara, called and rescheduled ERCP 9/22/20 at 1030 and covid-19 test 9/19/20 at 1130 at [facility].  Instructions discussed.

## 2020-09-19 ENCOUNTER — LAB VISIT (OUTPATIENT)
Dept: URGENT CARE | Facility: CLINIC | Age: 80
End: 2020-09-19
Payer: MEDICARE

## 2020-09-19 DIAGNOSIS — K83.1 BILIARY OBSTRUCTION: ICD-10-CM

## 2020-09-19 PROCEDURE — U0003 INFECTIOUS AGENT DETECTION BY NUCLEIC ACID (DNA OR RNA); SEVERE ACUTE RESPIRATORY SYNDROME CORONAVIRUS 2 (SARS-COV-2) (CORONAVIRUS DISEASE [COVID-19]), AMPLIFIED PROBE TECHNIQUE, MAKING USE OF HIGH THROUGHPUT TECHNOLOGIES AS DESCRIBED BY CMS-2020-01-R: HCPCS

## 2020-09-20 LAB — SARS-COV-2 RNA RESP QL NAA+PROBE: NOT DETECTED

## 2020-09-22 ENCOUNTER — HOSPITAL ENCOUNTER (OUTPATIENT)
Facility: HOSPITAL | Age: 80
Discharge: HOME | End: 2020-09-22
Attending: INTERNAL MEDICINE | Admitting: INTERNAL MEDICINE
Payer: MEDICARE

## 2020-09-22 ENCOUNTER — ANESTHESIA EVENT (OUTPATIENT)
Dept: ENDOSCOPY | Facility: HOSPITAL | Age: 80
End: 2020-09-22
Payer: MEDICARE

## 2020-09-22 ENCOUNTER — ANESTHESIA (OUTPATIENT)
Dept: ENDOSCOPY | Facility: HOSPITAL | Age: 80
End: 2020-09-22
Payer: MEDICARE

## 2020-09-22 VITALS
WEIGHT: 170 LBS | OXYGEN SATURATION: 98 % | BODY MASS INDEX: 31.28 KG/M2 | HEART RATE: 74 BPM | DIASTOLIC BLOOD PRESSURE: 73 MMHG | TEMPERATURE: 98 F | HEIGHT: 62 IN | RESPIRATION RATE: 20 BRPM | SYSTOLIC BLOOD PRESSURE: 146 MMHG

## 2020-09-22 DIAGNOSIS — K80.50 CHOLEDOCHOLITHIASIS: Primary | ICD-10-CM

## 2020-09-22 LAB — POCT GLUCOSE: 140 MG/DL (ref 70–110)

## 2020-09-22 PROCEDURE — 37000009 HC ANESTHESIA EA ADD 15 MINS: Performed by: INTERNAL MEDICINE

## 2020-09-22 PROCEDURE — 82962 GLUCOSE BLOOD TEST: CPT | Performed by: INTERNAL MEDICINE

## 2020-09-22 PROCEDURE — D9220A PRA ANESTHESIA: ICD-10-PCS | Mod: CRNA,,, | Performed by: NURSE ANESTHETIST, CERTIFIED REGISTERED

## 2020-09-22 PROCEDURE — 74328 PR  X-RAY FOR BILE DUCT ENDOSCOPY: ICD-10-PCS | Mod: 26,,, | Performed by: INTERNAL MEDICINE

## 2020-09-22 PROCEDURE — 43264 PR ERCP,W/REMOVAL STONE,BIL/PANCR DUCTS: ICD-10-PCS | Mod: 51,,, | Performed by: INTERNAL MEDICINE

## 2020-09-22 PROCEDURE — D9220A PRA ANESTHESIA: Mod: ANES,,, | Performed by: ANESTHESIOLOGY

## 2020-09-22 PROCEDURE — 43264 ERCP REMOVE DUCT CALCULI: CPT | Mod: 51,,, | Performed by: INTERNAL MEDICINE

## 2020-09-22 PROCEDURE — 27202127 HC STENT INTRODUCER: Performed by: INTERNAL MEDICINE

## 2020-09-22 PROCEDURE — 43264 ERCP REMOVE DUCT CALCULI: CPT | Performed by: INTERNAL MEDICINE

## 2020-09-22 PROCEDURE — 25000003 PHARM REV CODE 250: Performed by: INTERNAL MEDICINE

## 2020-09-22 PROCEDURE — D9220A PRA ANESTHESIA: ICD-10-PCS | Mod: ANES,,, | Performed by: ANESTHESIOLOGY

## 2020-09-22 PROCEDURE — 74328 X-RAY BILE DUCT ENDOSCOPY: CPT | Mod: 26,,, | Performed by: INTERNAL MEDICINE

## 2020-09-22 PROCEDURE — 63600175 PHARM REV CODE 636 W HCPCS: Performed by: NURSE ANESTHETIST, CERTIFIED REGISTERED

## 2020-09-22 PROCEDURE — D9220A PRA ANESTHESIA: Mod: CRNA,,, | Performed by: NURSE ANESTHETIST, CERTIFIED REGISTERED

## 2020-09-22 PROCEDURE — 43276 ERCP STENT EXCHANGE W/DILATE: CPT | Performed by: INTERNAL MEDICINE

## 2020-09-22 PROCEDURE — 27201089 HC SNARE, DISP (ANY): Performed by: INTERNAL MEDICINE

## 2020-09-22 PROCEDURE — C2617 STENT, NON-COR, TEM W/O DEL: HCPCS | Performed by: INTERNAL MEDICINE

## 2020-09-22 PROCEDURE — 43276 PR ERCP W/RMVL/EXCH STENT BILIARY/PANCREATIC DUCT W/DILATION: ICD-10-PCS | Mod: ,,, | Performed by: INTERNAL MEDICINE

## 2020-09-22 PROCEDURE — 27202125 HC BALLOON, EXTRACTION (ANY): Performed by: INTERNAL MEDICINE

## 2020-09-22 PROCEDURE — C1769 GUIDE WIRE: HCPCS | Performed by: INTERNAL MEDICINE

## 2020-09-22 PROCEDURE — 74328 X-RAY BILE DUCT ENDOSCOPY: CPT | Performed by: INTERNAL MEDICINE

## 2020-09-22 PROCEDURE — 43276 ERCP STENT EXCHANGE W/DILATE: CPT | Mod: ,,, | Performed by: INTERNAL MEDICINE

## 2020-09-22 PROCEDURE — 37000008 HC ANESTHESIA 1ST 15 MINUTES: Performed by: INTERNAL MEDICINE

## 2020-09-22 RX ORDER — OXYMETAZOLINE HCL 0.05 %
2 SPRAY, NON-AEROSOL (ML) NASAL 2 TIMES DAILY
Status: DISCONTINUED | OUTPATIENT
Start: 2020-09-22 | End: 2020-09-22 | Stop reason: HOSPADM

## 2020-09-22 RX ORDER — LIDOCAINE HYDROCHLORIDE 20 MG/ML
INJECTION INTRAVENOUS
Status: DISCONTINUED | OUTPATIENT
Start: 2020-09-22 | End: 2020-09-22

## 2020-09-22 RX ORDER — SODIUM CHLORIDE 0.9 % (FLUSH) 0.9 %
3 SYRINGE (ML) INJECTION
Status: DISCONTINUED | OUTPATIENT
Start: 2020-09-22 | End: 2020-09-22 | Stop reason: HOSPADM

## 2020-09-22 RX ORDER — DIPHENHYDRAMINE HYDROCHLORIDE 50 MG/ML
25 INJECTION INTRAMUSCULAR; INTRAVENOUS EVERY 6 HOURS PRN
Status: DISCONTINUED | OUTPATIENT
Start: 2020-09-22 | End: 2020-09-22 | Stop reason: HOSPADM

## 2020-09-22 RX ORDER — DEXAMETHASONE SODIUM PHOSPHATE 4 MG/ML
INJECTION, SOLUTION INTRA-ARTICULAR; INTRALESIONAL; INTRAMUSCULAR; INTRAVENOUS; SOFT TISSUE
Status: DISCONTINUED | OUTPATIENT
Start: 2020-09-22 | End: 2020-09-22

## 2020-09-22 RX ORDER — PROPOFOL 10 MG/ML
VIAL (ML) INTRAVENOUS CONTINUOUS PRN
Status: DISCONTINUED | OUTPATIENT
Start: 2020-09-22 | End: 2020-09-22

## 2020-09-22 RX ORDER — ONDANSETRON 2 MG/ML
INJECTION INTRAMUSCULAR; INTRAVENOUS
Status: DISCONTINUED | OUTPATIENT
Start: 2020-09-22 | End: 2020-09-22

## 2020-09-22 RX ORDER — SODIUM CHLORIDE 0.9 % (FLUSH) 0.9 %
10 SYRINGE (ML) INJECTION
Status: DISCONTINUED | OUTPATIENT
Start: 2020-09-22 | End: 2020-09-22 | Stop reason: HOSPADM

## 2020-09-22 RX ORDER — CIPROFLOXACIN 500 MG/1
500 TABLET ORAL EVERY 12 HOURS
Qty: 10 TABLET | Refills: 0 | Status: SHIPPED | OUTPATIENT
Start: 2020-09-22 | End: 2020-09-27

## 2020-09-22 RX ORDER — PROPOFOL 10 MG/ML
VIAL (ML) INTRAVENOUS
Status: DISCONTINUED | OUTPATIENT
Start: 2020-09-22 | End: 2020-09-22

## 2020-09-22 RX ORDER — CIPROFLOXACIN 2 MG/ML
INJECTION, SOLUTION INTRAVENOUS
Status: DISCONTINUED | OUTPATIENT
Start: 2020-09-22 | End: 2020-09-22

## 2020-09-22 RX ORDER — SODIUM CHLORIDE 9 MG/ML
INJECTION, SOLUTION INTRAVENOUS CONTINUOUS
Status: DISCONTINUED | OUTPATIENT
Start: 2020-09-22 | End: 2020-09-22 | Stop reason: HOSPADM

## 2020-09-22 RX ADMIN — SODIUM CHLORIDE: 0.9 INJECTION, SOLUTION INTRAVENOUS at 11:09

## 2020-09-22 RX ADMIN — CIPROFLOXACIN 400 MG: 2 INJECTION, SOLUTION INTRAVENOUS at 11:09

## 2020-09-22 RX ADMIN — LIDOCAINE HYDROCHLORIDE 60 MG: 20 INJECTION, SOLUTION INTRAVENOUS at 11:09

## 2020-09-22 RX ADMIN — PROPOFOL 40 MG: 10 INJECTION, EMULSION INTRAVENOUS at 11:09

## 2020-09-22 RX ADMIN — DEXAMETHASONE SODIUM PHOSPHATE 4 MG: 4 INJECTION, SOLUTION INTRA-ARTICULAR; INTRALESIONAL; INTRAMUSCULAR; INTRAVENOUS; SOFT TISSUE at 11:09

## 2020-09-22 RX ADMIN — SODIUM CHLORIDE: 0.9 INJECTION, SOLUTION INTRAVENOUS at 09:09

## 2020-09-22 RX ADMIN — ONDANSETRON 4 MG: 2 INJECTION, SOLUTION INTRAMUSCULAR; INTRAVENOUS at 11:09

## 2020-09-22 RX ADMIN — PROPOFOL 100 MCG/KG/MIN: 10 INJECTION, EMULSION INTRAVENOUS at 11:09

## 2020-09-22 NOTE — TRANSFER OF CARE
"Anesthesia Transfer of Care Note    Patient: Justa Acosta    Procedure(s) Performed: Procedure(s) (LRB):  ERCP (ENDOSCOPIC RETROGRADE CHOLANGIOPANCREATOGRAPHY) (N/A)    Patient location: [facility]    Anesthesia Type: general    Transport from OR: Transported from OR on 2-3 L/min O2 by NC with adequate spontaneous ventilation    Post pain: adequate analgesia    Post assessment: no apparent anesthetic complications and tolerated procedure well    Post vital signs: stable    Level of consciousness: awake, alert and oriented    Nausea/Vomiting: no nausea/vomiting    Complications: none    Transfer of care protocol was followed      Last vitals:   Visit Vitals  BP (!) 167/70 (Patient Position: Lying)   Pulse 78   Temp 36.5 °C (97.7 °F) (Oral)   Resp 18   Ht 5' 2" (1.575 m)   Wt 77.1 kg (170 lb)   LMP  (LMP Unknown)   SpO2 99%   Breastfeeding No   BMI 31.09 kg/m²     "

## 2020-09-22 NOTE — [employer] PATIENT INSTRUCTIONS
Discharge Summary/Instructions after an Endoscopic Procedure  Patient Name: Justa Acosta  Patient MRN: [id number]  Patient YOB: 1940 Tuesday, September 22, 2020  Tray Hsu MD  RESTRICTIONS:  During your procedure today, you received medications for sedation.  These   medications may affect your judgment, balance and coordination.  Therefore,   for 24 hours, you have the following restrictions:   - DO NOT drive a car, operate machinery, make legal/financial decisions,   sign important papers or drink alcohol.    ACTIVITY:  Today: no heavy lifting, straining or running due to procedural   sedation/anesthesia.  The following day: return to full activity including work.  DIET:  Eat and drink normally unless instructed otherwise.     TREATMENT FOR COMMON SIDE EFFECTS:  - Mild abdominal pain, nausea, belching, bloating or excessive gas:  rest,   eat lightly and use a heating pad.  - Sore Throat: treat with throat lozenges and/or gargle with warm salt   water.  - Because air was used during the procedure, expelling large amounts of air   from your rectum or belching is normal.  - If a bowel prep was taken, you may not have a bowel movement for 1-3 days.    This is normal.  SYMPTOMS TO WATCH FOR AND REPORT TO YOUR PHYSICIAN:  1. Abdominal pain or bloating, other than gas cramps.  2. Chest pain.  3. Back pain.  4. Signs of infection such as: chills or fever occurring within 24 hours   after the procedure.  5. Rectal bleeding, which would show as bright red, maroon, or black stools.   (A tablespoon of blood from the rectum is not serious, especially if   hemorrhoids are present.)  6. Vomiting.  7. Weakness or dizziness.  GO DIRECTLY TO THE NEAREST EMERGENCY ROOM IF YOU HAVE ANY OF THE FOLLOWING:      Difficulty breathing              Chills and/or fever over 101 F   Persistent vomiting and/or vomiting blood   Severe abdominal pain   Severe chest pain   Black, tarry stools   Bleeding- more than one  tablespoon   Any other symptom or condition that you feel may need urgent attention  Your doctor recommends these additional instructions:  If any biopsies were taken, your doctors clinic will contact you in 1 to 2   weeks with any results.  - Discharge patient to home (ambulatory).   - Watch for pancreatitis, bleeding, perforation, and cholangitis.   - Cipro (ciprofloxacin) 500 mg PO BID for 5 days.   - Repeat ERCP in 8 weeks to remove stent.  For questions, problems or results please call your physician - Tray Hsu MD at Work:  [phone].  [facility], EMERGENCY ROOM PHONE NUMBER: [phone]  IF A COMPLICATION OR EMERGENCY SITUATION ARISES AND YOU ARE UNABLE TO REACH   YOUR PHYSICIAN - GO DIRECTLY TO THE EMERGENCY ROOM.  Tray Hsu MD  9/22/2020 12:23:10 PM  This report has been verified and signed electronically.  [employer]   hair removal not indicated

## 2020-09-22 NOTE — DISCHARGE SUMMARY
Discharge Summary/Instructions after an Endoscopic Procedure    Patient Name: Justa Acosta  Patient MRN: [id number]  Patient YOB: 1940 Tuesday, September 22, 2020  Tray Hsu MD    RESTRICTIONS:  During your procedure today, you received medications for sedation.  These medications may affect your judgment, balance and coordination.  Therefore, for 24 hours, you have the following restrictions:     - DO NOT drive a car, operate machinery, make legal/financial decisions, sign important papers or drink alcohol.      ACTIVITY:  Today: no heavy lifting, straining or running due to procedural sedation/anesthesia.  The following day: return to full activity including work.    DIET:  Eat and drink normally unless instructed otherwise.     TREATMENT FOR COMMON SIDE EFFECTS:  - Mild abdominal pain, nausea, belching, bloating or excessive gas:  rest, eat lightly and use a heating pad.  - Sore Throat: treat with throat lozenges and/or gargle with warm salt water.  - Because air was used during the procedure, expelling large amounts of air from your rectum or belching is normal.  - If a bowel prep was taken, you may not have a bowel movement for 1-3 days.  This is normal.      SYMPTOMS TO WATCH FOR AND REPORT TO YOUR PHYSICIAN:  1. Abdominal pain or bloating, other than gas cramps.  2. Chest pain.  3. Back pain.  4. Signs of infection such as: chills or fever occurring within 24 hours after the procedure.  5. Rectal bleeding, which would show as bright red, maroon, or black stools. (A tablespoon of blood from the rectum is not serious, especially if hemorrhoids are present.)  6. Vomiting.  7. Weakness or dizziness.      GO DIRECTLY TO THE NEAREST EMERGENCY ROOM IF YOU HAVE ANY OF THE FOLLOWING:     Difficulty breathing              Chills and/or fever over 101 F   Persistent vomiting and/or vomiting blood   Severe abdominal pain   Severe chest pain   Black, tarry stools   Bleeding- more than one  tablespoon   Any other symptom or condition that you feel may need urgent attention    Your doctor recommends these additional instructions:  If any biopsies were taken, your doctors clinic will contact you in 1 to 2 weeks with any results.    - Discharge patient to home (ambulatory).   - Watch for pancreatitis, bleeding, perforation, and cholangitis.   - Cipro (ciprofloxacin) 500 mg PO BID for 5 days.   - Repeat ERCP in 8 weeks to remove stent.    For questions, problems or results please call your physician - Tray sHu MD at Work:  [phone].    [facility], EMERGENCY ROOM PHONE NUMBER: [phone]    IF A COMPLICATION OR EMERGENCY SITUATION ARISES AND YOU ARE UNABLE TO REACH YOUR PHYSICIAN - GO DIRECTLY TO THE EMERGENCY ROOM.

## 2020-09-22 NOTE — ANESTHESIA POSTPROCEDURE EVALUATION
Anesthesia Post Evaluation    Patient: Justa Acosta    Procedure(s) Performed: Procedure(s) (LRB):  ERCP (ENDOSCOPIC RETROGRADE CHOLANGIOPANCREATOGRAPHY) (N/A)    Final Anesthesia Type: general    Patient location during evaluation: PACU  Patient participation: Yes- Able to Participate  Level of consciousness: awake and alert  Post-procedure vital signs: reviewed and stable  Pain management: adequate  Airway patency: patent    PONV status at discharge: No PONV  Anesthetic complications: no      Cardiovascular status: hemodynamically stable  Respiratory status: spontaneous ventilation  Follow-up not needed.          Vitals Value Taken Time   /70 09/22/20 1231   Temp 36.6 °C (97.9 °F) 09/22/20 1227   Pulse 78 09/22/20 1235   Resp 22 09/22/20 1230   SpO2 94 % 09/22/20 1235   Vitals shown include unvalidated device data.      No case tracking events are documented in the log.      Pain/Bridgette Score: Bridgette Score: 9 (9/22/2020 12:27 PM)

## 2020-09-22 NOTE — DISCHARGE INSTRUCTIONS
ERCP (Endoscopic Retrograde Cholangiopancreatography)     A balloon at the tip of a catheter opens above the stone. The stone is pulled out of the duct and leaves your body through stool.     ERCP stands for endoscopic retrograde cholangiopancreatography. This procedure is used to view the biliary and pancreatic ducts.  It is used to evaluate diseases that affect the ducts and to help locate and treat blockages that may be present.  How do I get ready for ERCP?  · Talk to your doctor about any health problems or allergies you have.  · Ask your doctor about the risks of ERCP. These include pancreatitis, infection, bleeding, and tearing the bowel.  · Be sure your doctor knows about all medicines you take. You may be told to stop taking some or all of them before the test. This includes:  · All prescription medicines  · Over-the-counter medicines that don't need a prescription  ·  Any street drugs you may use   · Herbs, vitamins, kelp, seaweed, cough syrups, and other supplements  · You may be asked to take antibiotics ahead of time.  · Avoid blood-thinning medicines for 1 week before ERCP.  · Do not eat or drink for 8 to 12 hours before ERCP.  · Have someone ready to take you home.  What happens during the procedure?  · You may be given medicine through an IV to help you relax.  · Your throat is numbed.  · A thin tube (endoscope) is placed into your throat. It is advanced from the throat through the upper digestive tract, to the common bile duct opening. The endoscope lets the doctor see the common bile and pancreatic ducts on a video screen.  · A cut may be made where the common bile duct opens to the duodenum to make it easier to remove stones.  · As blockages are located and removed, X-rays are taken.  · Contrast dye is injected through a catheter to make the duct show up better on the X-rays.  · An imaging technique that uses X-rays to obtain real-time moving images of internal organs is called fluoroscopy.  Fluoroscopy is used to watch and guide progress of the procedure.   · In some cases, a plastic tube (stent) is placed to hold the ducts open. This stent may be replaced or removed in 6 to 8 weeks. Or it may be left to fall out on its own and be passed in the stool.  What happens after ERCP?  Your doctor may discuss the test results right away or a return visit may be scheduled. You may go home the same day or spend the night in the hospital. Follow these tips:  · You can return to a normal routine the day after the ERCP.  · If a cut was made in the duct, avoid blood-thinning medicines such as aspirin for 5 to 7 days.  · Call your doctor right away if you have a fever or abdominal pain. These may be signs of an infection or torn bowel.   Date Last Reviewed: 6/19/2015  © 9130-2146 [employer]. [address] [place] [address], [place] [address]. All rights reserved. This information is not intended as a substitute for professional medical care. Always follow your healthcare professional's instructions.

## 2020-09-22 NOTE — ANESTHESIA PREPROCEDURE EVALUATION
"                                                                                                             09/22/2020  Pre-operative evaluation for Procedure(s) (LRB):  ERCP (ENDOSCOPIC RETROGRADE CHOLANGIOPANCREATOGRAPHY) (N/A)    Justa Acosta is a 80 y.o. female with bile duct stone here for ERCP. No issues with previous ERCPs    Patient Active Problem List   Diagnosis    Traumatic arthritis    DJD (degenerative joint disease)    Ankle joint pain    Postop check    Surgical wound dehiscence    Hallux rigidus    Fever    DM (diabetes mellitus)    Vascular dementia    Transaminitis    HTN (hypertension)    Chronic kidney disease, stage III (moderate)    Hypophosphatemia    Abnormal liver enzymes    Pancreatitis    History of acute cholangitis    Cholangitis    Dementia without behavioral disturbance    PVD (peripheral vascular disease)    Hyperlipidemia       Review of patient's allergies indicates:   Allergen Reactions    Hydromorphone Itching, Nausea And Vomiting and Other (See Comments)     Insomnia, not controlled by Benadryl    Adhesive Other (See Comments)     blister    Celebrex [celecoxib] Other (See Comments)     Burns face "like sunburn"    Celexa [citalopram] Other (See Comments)     Sweating, anxiety    Codeine Itching    Colchicine analogues Other (See Comments)     Raises blood pressure    Cymbalta [duloxetine] Itching and Other (See Comments)     Diarrhea, insomnia    Darvocet a500 [propoxyphene n-acetaminophen] Itching and Nausea And Vomiting    Effexor [venlafaxine] Nausea And Vomiting and Other (See Comments)     Headache, depression, insomnia    Felodipine Other (See Comments)     Swelling of extremities    Fentanyl Itching and Nausea And Vomiting    Gabapentin Itching and Other (See Comments)     Insomnia and severe joint pain    Hydrochlorothiazide      For Dr Olivas notes    Hydrocodone-acetaminophen Itching     Uncontrolled with Benadryl    Lasix " "[furosemide] Itching and Other (See Comments)     insomnia    Lexapro [escitalopram] Itching, Nausea And Vomiting and Other (See Comments)     "mind races", insomnia    Nubain [nalbuphine] Nausea And Vomiting    Persantine [dipyridamole] Nausea And Vomiting    Relafen [nabumetone] Nausea Only    Spironolactone Itching, Nausea And Vomiting and Other (See Comments)     insomnia    Tramadol Itching and Other (See Comments)     insomnia    Triamterene-hydrochlorothiazid Itching    Zoloft [sertraline] Itching and Other (See Comments)     "Blood vessels break in legs"       No current facility-administered medications on file prior to encounter.      Current Outpatient Medications on File Prior to Encounter   Medication Sig Dispense Refill    ALPRAZolam (XANAX) 0.25 MG tablet Take 0.125 mg by mouth every 12 (twelve) hours. Every morning and evening      amitriptyline (ELAVIL) 10 MG tablet Take 10 mg by mouth every evening.      amLODIPine (NORVASC) 5 MG tablet Take 5 mg by mouth every morning.       aspirin (ECOTRIN) 81 MG EC tablet Take 1 tablet (81 mg total) by mouth once daily. Avoid aspirin and nonsteroidal anti-inflammatory medicines for 2 weeks.  0    atorvastatin (LIPITOR) 40 MG tablet Take 40 mg by mouth every evening.      cetirizine (ZYRTEC) 10 MG tablet Take 10 mg by mouth every evening.      donepezil (ARICEPT) 10 MG tablet Take 10 mg by mouth every evening.      fesoterodine (TOVIAZ) 4 mg Tb24 Take 4 mg by mouth every evening.      FLUoxetine 40 MG capsule Take 40 mg by mouth every morning.       hydroxychloroquine (PLAQUENIL) 200 mg tablet Take 200 mg by mouth every morning.       irbesartan (AVAPRO) 150 MG tablet Take 150 mg by mouth once daily.       L. gasseri-B. bifidum-B longum ([place]) 1.5 billion cell Cap Take 1 capsule by mouth once daily.      memantine (NAMENDA XR) 28 mg CSpX Take 28 mg by mouth Daily.       mirabegron (MYRBETRIQ) 50 mg Tb24 Take 50 mg by mouth " once daily.      MV-MIN/FA/D3/OM-3/DHA/EPA/FISH (CARDIAMIN ORAL) Take by mouth 2 (two) times daily.       pantoprazole (PROTONIX) 40 MG tablet Take 40 mg by mouth every morning.      pilocarpine (SALAGEN) 5 MG Tab Take 10 mg by mouth every morning.      pilocarpine (SALAGEN) 5 MG Tab Take 5 mg by mouth every evening.      SITagliptin (JANUVIA) 100 MG Tab Take 100 mg by mouth every morning.      sodium chloride 0.9% 0.9 % SolP with fentaNYL 50 mcg/mL Soln 2 mcg/mL, bupivacaine 0.5% (5 mg/ml) 0.5 % (5 mg/mL) Soln 0.125 % by Epidural route continuous. Morphine added      ursodiol (ACTIGALL) 500 MG tablet Take 750 mg by mouth every evening.          Past Surgical History:   Procedure Laterality Date    ABDOMINAL AORTA STENT      ADENOIDECTOMY      APPENDECTOMY      BACK SURGERY      removal of cyst on spine    BALLOON ANGIOPLASTY, ARTERY  1995    stents in abdomen, very near aorta, to legs    BONE RESECTION, RIB  1993    left 1rst rib, damaged left  brachial plexus per pt    BREAST SURGERY      CARDIAC CATHETERIZATION  1995    CHOLECYSTECTOMY      ERCP N/A 8/18/2020    Procedure: ERCP (ENDOSCOPIC RETROGRADE CHOLANGIOPANCREATOGRAPHY);  Surgeon: Chente Sutherland MD;  Location: [address]);  Service: Endoscopy;  Laterality: N/A;    EYE SURGERY      cataract    FIRST RIB REMOVAL Right     FRACTURE SURGERY      HYSTERECTOMY      INCISION AND DRAINAGE FOOT  April 2013    graft to top of right foot, non-healing wound    INTRATHECAL PUMP IMPLANTATION  2011    pain pump, Bupivacaine,Morphine, Fentanyl    JOINT REPLACEMENT  Feb 2013    right ankle,total- was [facility] post -op several days for pain control    neuro stimulator placement      OTHER SURGICAL HISTORY      removal of spinal cord stimulator    SPINE SURGERY      TIBIA FRACTURE SURGERY  1971, 72, 73, 74    Right tibia pin, bone graft, compression plate, removal of plate    TONSILLECTOMY      TOTAL ABDOMINAL HYSTERECTOMY W/ BILATERAL  SALPINGOOPHORECTOMY      TRIGGER FINGER RELEASE  2011    right ring finger    VAGINAL DELIVERY      times 2       Social History     Socioeconomic History    Marital status:      Spouse name: Not on file    Number of children: Not on file    Years of education: Not on file    Highest education level: Not on file   Occupational History    Not on file   Social Needs    Financial resource strain: Not on file    Food insecurity     Worry: Not on file     Inability: Not on file    Transportation needs     Medical: Not on file     Non-medical: Not on file   Tobacco Use    Smoking status: Former Smoker    Smokeless tobacco: Never Used    Tobacco comment: 3 packs per day x 25 yrs, stopped 25 yrs ago   Substance and Sexual Activity    Alcohol use: No    Drug use: Yes     Types: Fentanyl, Morphine     Comment: intrathecal pain pump    Sexual activity: Not on file   Lifestyle    Physical activity     Days per week: Not on file     Minutes per session: Not on file    Stress: Not on file   Relationships    Social connections     Talks on phone: Not on file     Gets together: Not on file     Attends Hoah[employer]ism service: Not on file     Active member of club or organization: Not on file     Attends meetings of clubs or organizations: Not on file     Relationship status: Not on file   Other Topics Concern    Not on file   Social History Narrative    Not on file             Anesthesia Evaluation    I have reviewed the Patient Summary Reports.    I have reviewed the Nursing Notes. I have reviewed the NPO Status.      Review of Systems  Anesthesia Hx:  No problems with previous Anesthesia Hx of Anesthetic complications PONV   Cardiovascular:   Hypertension CAD      Pulmonary:   Sleep Apnea    Renal/:   Chronic Renal Disease, CRI    Hepatic/GI:   GERD Liver Disease, Hepatitis    Musculoskeletal:   Arthritis     Endocrine:   Diabetes        Physical Exam  General:  Well nourished    Airway/Jaw/Neck:  Airway  Findings: Mouth Opening: Normal Tongue: Normal  General Airway Assessment: Adult  Mallampati: II  TM Distance: Normal, at least 6 cm       Chest/Lungs:  Chest/Lungs Findings: Clear to auscultation, Normal Respiratory Rate     Heart/Vascular:  Heart Findings: Rate: Normal             Anesthesia Plan  Type of Anesthesia, risks & benefits discussed:  Anesthesia Type:  general, MAC  Patient's Preference:   Intra-op Monitoring Plan: standard ASA monitors  Intra-op Monitoring Plan Comments:   Post Op Pain Control Plan: multimodal analgesia and IV/PO Opioids PRN  Post Op Pain Control Plan Comments:   Induction:   IV  Beta Blocker:         Informed Consent: Patient understands risks and agrees with Anesthesia plan.  Questions answered. Anesthesia consent signed with patient.  ASA Score: 3     Day of Surgery Review of History & Physical:            Ready For Surgery From Anesthesia Perspective.
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
## 2020-09-23 ENCOUNTER — TELEPHONE (OUTPATIENT)
Dept: ENDOSCOPY | Facility: HOSPITAL | Age: 80
End: 2020-09-23

## 2020-09-23 DIAGNOSIS — K80.50 COMMON BILE DUCT STONE: Primary | ICD-10-CM

## 2020-10-14 ENCOUNTER — TELEPHONE (OUTPATIENT)
Dept: ENDOSCOPY | Facility: HOSPITAL | Age: 80
End: 2020-10-14

## 2020-10-14 DIAGNOSIS — K80.50 CHOLEDOCHOLITHIASIS: ICD-10-CM

## 2020-10-14 NOTE — TELEPHONE ENCOUNTER
Patient's daughter, Dara, called and scheduled ERCP 11/9/20 at 0930 and covid-19 test 11/6/20 at 1045 at [facility].  Instructions discussed and mailed to Dara's address.

## 2020-11-06 ENCOUNTER — LAB VISIT (OUTPATIENT)
Dept: URGENT CARE | Facility: CLINIC | Age: 80
End: 2020-11-06
Payer: MEDICARE

## 2020-11-06 VITALS
TEMPERATURE: 98 F | OXYGEN SATURATION: 97 % | HEART RATE: 83 BPM | DIASTOLIC BLOOD PRESSURE: 63 MMHG | SYSTOLIC BLOOD PRESSURE: 116 MMHG

## 2020-11-06 DIAGNOSIS — K80.50 CHOLEDOCHOLITHIASIS: ICD-10-CM

## 2020-11-06 PROCEDURE — U0003 INFECTIOUS AGENT DETECTION BY NUCLEIC ACID (DNA OR RNA); SEVERE ACUTE RESPIRATORY SYNDROME CORONAVIRUS 2 (SARS-COV-2) (CORONAVIRUS DISEASE [COVID-19]), AMPLIFIED PROBE TECHNIQUE, MAKING USE OF HIGH THROUGHPUT TECHNOLOGIES AS DESCRIBED BY CMS-2020-01-R: HCPCS

## 2020-11-07 LAB — SARS-COV-2 RNA RESP QL NAA+PROBE: NOT DETECTED

## 2020-11-09 ENCOUNTER — ANESTHESIA (OUTPATIENT)
Dept: ENDOSCOPY | Facility: HOSPITAL | Age: 80
End: 2020-11-09
Payer: MEDICARE

## 2020-11-09 ENCOUNTER — HOSPITAL ENCOUNTER (OUTPATIENT)
Facility: HOSPITAL | Age: 80
Discharge: HOME | End: 2020-11-09
Attending: INTERNAL MEDICINE | Admitting: INTERNAL MEDICINE
Payer: MEDICARE

## 2020-11-09 ENCOUNTER — ANESTHESIA EVENT (OUTPATIENT)
Dept: ENDOSCOPY | Facility: HOSPITAL | Age: 80
End: 2020-11-09
Payer: MEDICARE

## 2020-11-09 VITALS
TEMPERATURE: 98 F | WEIGHT: 169 LBS | HEART RATE: 79 BPM | DIASTOLIC BLOOD PRESSURE: 67 MMHG | HEIGHT: 63 IN | BODY MASS INDEX: 29.95 KG/M2 | RESPIRATION RATE: 14 BRPM | OXYGEN SATURATION: 95 % | SYSTOLIC BLOOD PRESSURE: 150 MMHG

## 2020-11-09 DIAGNOSIS — K80.50 CHOLEDOCHOLITHIASIS: Primary | ICD-10-CM

## 2020-11-09 LAB
POCT GLUCOSE: 161 MG/DL (ref 70–110)
POCT GLUCOSE: 198 MG/DL (ref 70–110)

## 2020-11-09 PROCEDURE — 63600175 PHARM REV CODE 636 W HCPCS: Performed by: NURSE ANESTHETIST, CERTIFIED REGISTERED

## 2020-11-09 PROCEDURE — 25500020 PHARM REV CODE 255: Performed by: INTERNAL MEDICINE

## 2020-11-09 PROCEDURE — 25000003 PHARM REV CODE 250: Performed by: NURSE ANESTHETIST, CERTIFIED REGISTERED

## 2020-11-09 PROCEDURE — 43264 PR ERCP,W/REMOVAL STONE,BIL/PANCR DUCTS: ICD-10-PCS | Mod: 51,,, | Performed by: INTERNAL MEDICINE

## 2020-11-09 PROCEDURE — 74328 X-RAY BILE DUCT ENDOSCOPY: CPT | Mod: 26,,, | Performed by: INTERNAL MEDICINE

## 2020-11-09 PROCEDURE — 25000003 PHARM REV CODE 250: Performed by: INTERNAL MEDICINE

## 2020-11-09 PROCEDURE — 27202125 HC BALLOON, EXTRACTION (ANY): Performed by: INTERNAL MEDICINE

## 2020-11-09 PROCEDURE — D9220A PRA ANESTHESIA: Mod: CRNA,,, | Performed by: NURSE ANESTHETIST, CERTIFIED REGISTERED

## 2020-11-09 PROCEDURE — C1769 GUIDE WIRE: HCPCS | Performed by: INTERNAL MEDICINE

## 2020-11-09 PROCEDURE — D9220A PRA ANESTHESIA: ICD-10-PCS | Mod: ANES,,, | Performed by: ANESTHESIOLOGY

## 2020-11-09 PROCEDURE — 43264 ERCP REMOVE DUCT CALCULI: CPT | Performed by: INTERNAL MEDICINE

## 2020-11-09 PROCEDURE — 43275 PR ERCP W/REMOVAL FOREIGN BODY/STENT FROM BILIARY/PANCREATIC DUCT: ICD-10-PCS | Mod: ,,, | Performed by: INTERNAL MEDICINE

## 2020-11-09 PROCEDURE — 74328 PR  X-RAY FOR BILE DUCT ENDOSCOPY: ICD-10-PCS | Mod: 26,,, | Performed by: INTERNAL MEDICINE

## 2020-11-09 PROCEDURE — D9220A PRA ANESTHESIA: Mod: ANES,,, | Performed by: ANESTHESIOLOGY

## 2020-11-09 PROCEDURE — 82962 GLUCOSE BLOOD TEST: CPT | Performed by: INTERNAL MEDICINE

## 2020-11-09 PROCEDURE — D9220A PRA ANESTHESIA: ICD-10-PCS | Mod: CRNA,,, | Performed by: NURSE ANESTHETIST, CERTIFIED REGISTERED

## 2020-11-09 PROCEDURE — 43275 ERCP REMOVE FORGN BODY DUCT: CPT | Performed by: INTERNAL MEDICINE

## 2020-11-09 PROCEDURE — 43275 ERCP REMOVE FORGN BODY DUCT: CPT | Mod: ,,, | Performed by: INTERNAL MEDICINE

## 2020-11-09 PROCEDURE — 37000009 HC ANESTHESIA EA ADD 15 MINS: Performed by: INTERNAL MEDICINE

## 2020-11-09 PROCEDURE — 27201089 HC SNARE, DISP (ANY): Performed by: INTERNAL MEDICINE

## 2020-11-09 PROCEDURE — 37000008 HC ANESTHESIA 1ST 15 MINUTES: Performed by: INTERNAL MEDICINE

## 2020-11-09 PROCEDURE — 43264 ERCP REMOVE DUCT CALCULI: CPT | Mod: 51,,, | Performed by: INTERNAL MEDICINE

## 2020-11-09 PROCEDURE — 74328 X-RAY BILE DUCT ENDOSCOPY: CPT | Performed by: INTERNAL MEDICINE

## 2020-11-09 RX ORDER — ONDANSETRON 2 MG/ML
INJECTION INTRAMUSCULAR; INTRAVENOUS
Status: DISCONTINUED | OUTPATIENT
Start: 2020-11-09 | End: 2020-11-09

## 2020-11-09 RX ORDER — PROPOFOL 10 MG/ML
VIAL (ML) INTRAVENOUS
Status: DISCONTINUED | OUTPATIENT
Start: 2020-11-09 | End: 2020-11-09

## 2020-11-09 RX ORDER — SODIUM CHLORIDE 9 MG/ML
INJECTION, SOLUTION INTRAVENOUS CONTINUOUS
Status: DISCONTINUED | OUTPATIENT
Start: 2020-11-09 | End: 2020-11-09 | Stop reason: HOSPADM

## 2020-11-09 RX ORDER — DEXMEDETOMIDINE HYDROCHLORIDE 100 UG/ML
INJECTION, SOLUTION INTRAVENOUS
Status: DISCONTINUED | OUTPATIENT
Start: 2020-11-09 | End: 2020-11-09

## 2020-11-09 RX ORDER — DEXAMETHASONE SODIUM PHOSPHATE 4 MG/ML
INJECTION, SOLUTION INTRA-ARTICULAR; INTRALESIONAL; INTRAMUSCULAR; INTRAVENOUS; SOFT TISSUE
Status: DISCONTINUED | OUTPATIENT
Start: 2020-11-09 | End: 2020-11-09

## 2020-11-09 RX ORDER — SODIUM CHLORIDE 0.9 % (FLUSH) 0.9 %
10 SYRINGE (ML) INJECTION
Status: DISCONTINUED | OUTPATIENT
Start: 2020-11-09 | End: 2020-11-09 | Stop reason: HOSPADM

## 2020-11-09 RX ORDER — CIPROFLOXACIN 500 MG/1
500 TABLET ORAL EVERY 12 HOURS
Qty: 10 TABLET | Refills: 0 | Status: SHIPPED | OUTPATIENT
Start: 2020-11-09 | End: 2020-11-14

## 2020-11-09 RX ORDER — ONDANSETRON 2 MG/ML
4 INJECTION INTRAMUSCULAR; INTRAVENOUS ONCE AS NEEDED
Status: DISCONTINUED | OUTPATIENT
Start: 2020-11-09 | End: 2020-11-09 | Stop reason: HOSPADM

## 2020-11-09 RX ORDER — PROPOFOL 10 MG/ML
VIAL (ML) INTRAVENOUS CONTINUOUS PRN
Status: DISCONTINUED | OUTPATIENT
Start: 2020-11-09 | End: 2020-11-09

## 2020-11-09 RX ORDER — LIDOCAINE HYDROCHLORIDE 20 MG/ML
INJECTION INTRAVENOUS
Status: DISCONTINUED | OUTPATIENT
Start: 2020-11-09 | End: 2020-11-09

## 2020-11-09 RX ADMIN — DEXAMETHASONE SODIUM PHOSPHATE 4 MG: 4 INJECTION, SOLUTION INTRAMUSCULAR; INTRAVENOUS at 09:11

## 2020-11-09 RX ADMIN — IOHEXOL 10 ML: 300 INJECTION, SOLUTION INTRAVENOUS at 09:11

## 2020-11-09 RX ADMIN — ONDANSETRON 4 MG: 2 INJECTION, SOLUTION INTRAMUSCULAR; INTRAVENOUS at 09:11

## 2020-11-09 RX ADMIN — LIDOCAINE HYDROCHLORIDE 80 MG: 20 INJECTION, SOLUTION INTRAVENOUS at 09:11

## 2020-11-09 RX ADMIN — PROPOFOL 100 MCG/KG/MIN: 10 INJECTION, EMULSION INTRAVENOUS at 09:11

## 2020-11-09 RX ADMIN — PROPOFOL 30 MG: 10 INJECTION, EMULSION INTRAVENOUS at 09:11

## 2020-11-09 RX ADMIN — SODIUM CHLORIDE: 0.9 INJECTION, SOLUTION INTRAVENOUS at 08:11

## 2020-11-09 RX ADMIN — DEXMEDETOMIDINE HYDROCHLORIDE 4 MCG: 100 INJECTION, SOLUTION, CONCENTRATE INTRAVENOUS at 09:11

## 2020-11-09 NOTE — TRANSFER OF CARE
"Anesthesia Transfer of Care Note    Patient: Justa Acosta    Procedure(s) Performed: Procedure(s) (LRB):  ERCP (ENDOSCOPIC RETROGRADE CHOLANGIOPANCREATOGRAPHY) (N/A)    Patient location: PACU    Anesthesia Type: general    Transport from OR: Transported from OR on 6-10 L/min O2 by face mask with adequate spontaneous ventilation    Post pain: adequate analgesia    Post assessment: no apparent anesthetic complications and tolerated procedure well    Post vital signs: stable    Level of consciousness: awake and responds to stimulation    Nausea/Vomiting: no nausea/vomiting    Complications: none    Transfer of care protocol was followed      Last vitals:   Visit Vitals  BP (!) 127/58   Pulse 84   Temp 36.6 °C (97.9 °F) (Temporal)   Resp 18   Ht 5' 2.5" (1.588 m)   Wt 76.7 kg (169 lb)   LMP  (LMP Unknown)   SpO2 100%   Breastfeeding No   BMI 30.42 kg/m²     "

## 2020-11-09 NOTE — ANESTHESIA PREPROCEDURE EVALUATION
"                                                                                                             11/09/2020  Justa Acosta is a 80 y.o., female   Pre-operative evaluation for Procedure(s) (LRB):  ERCP (ENDOSCOPIC RETROGRADE CHOLANGIOPANCREATOGRAPHY) (N/A)    Justa Acosta is a 80 y.o. female presenting for ERCP. Patient concerned about PONV. The last ERCP was good for her, no PONV experienced.    Patient Active Problem List   Diagnosis    Traumatic arthritis    DJD (degenerative joint disease)    Ankle joint pain    Postop check    Surgical wound dehiscence    Hallux rigidus    Fever    DM (diabetes mellitus)    Vascular dementia    Transaminitis    HTN (hypertension)    Chronic kidney disease, stage III (moderate)    Hypophosphatemia    Abnormal liver enzymes    Pancreatitis    History of acute cholangitis    Cholangitis    Dementia without behavioral disturbance    PVD (peripheral vascular disease)    Hyperlipidemia    Choledocholithiasis       Review of patient's allergies indicates:   Allergen Reactions    Hydromorphone Itching, Nausea And Vomiting and Other (See Comments)     Insomnia, not controlled by Benadryl    Adhesive Other (See Comments)     blister    Celebrex [celecoxib] Other (See Comments)     Burns face "like sunburn"    Celexa [citalopram] Other (See Comments)     Sweating, anxiety    Codeine Itching    Colchicine analogues Other (See Comments)     Raises blood pressure    Cymbalta [duloxetine] Itching and Other (See Comments)     Diarrhea, insomnia    Darvocet a500 [propoxyphene n-acetaminophen] Itching and Nausea And Vomiting    Effexor [venlafaxine] Nausea And Vomiting and Other (See Comments)     Headache, depression, insomnia    Felodipine Other (See Comments)     Swelling of extremities    Fentanyl Itching and Nausea And Vomiting    Gabapentin Itching and Other (See Comments)     Insomnia and severe joint pain    Hydrochlorothiazide  " "    For Dr Olivas notes    Hydrocodone-acetaminophen Itching     Uncontrolled with Benadryl    Lasix [furosemide] Itching and Other (See Comments)     insomnia    Lexapro [escitalopram] Itching, Nausea And Vomiting and Other (See Comments)     "mind races", insomnia    Nubain [nalbuphine] Nausea And Vomiting    Persantine [dipyridamole] Nausea And Vomiting    Relafen [nabumetone] Nausea Only    Spironolactone Itching, Nausea And Vomiting and Other (See Comments)     insomnia    Tramadol Itching and Other (See Comments)     insomnia    Triamterene-hydrochlorothiazid Itching    Zoloft [sertraline] Itching and Other (See Comments)     "Blood vessels break in legs"       No current facility-administered medications on file prior to encounter.      Current Outpatient Medications on File Prior to Encounter   Medication Sig Dispense Refill    ALPRAZolam (XANAX) 0.25 MG tablet Take 0.125 mg by mouth every 12 (twelve) hours. Every morning and evening      amitriptyline (ELAVIL) 10 MG tablet Take 10 mg by mouth every evening.      amLODIPine (NORVASC) 5 MG tablet Take 5 mg by mouth every morning.       aspirin (ECOTRIN) 81 MG EC tablet Take 1 tablet (81 mg total) by mouth once daily. Avoid aspirin and nonsteroidal anti-inflammatory medicines for 2 weeks.  0    atorvastatin (LIPITOR) 40 MG tablet Take 40 mg by mouth every evening.      cetirizine (ZYRTEC) 10 MG tablet Take 10 mg by mouth every evening.      donepezil (ARICEPT) 10 MG tablet Take 10 mg by mouth every evening.      fesoterodine (TOVIAZ) 4 mg Tb24 Take 4 mg by mouth every evening.      FLUoxetine 40 MG capsule Take 40 mg by mouth every morning.       hydroxychloroquine (PLAQUENIL) 200 mg tablet Take 200 mg by mouth every morning.       irbesartan (AVAPRO) 150 MG tablet Take 150 mg by mouth once daily.       L. gasseri-B. bifidum-B longum ([employer]) 1.5 billion cell Cap Take 1 capsule by mouth once daily.      memantine " (NAMENDA XR) 28 mg CSpX Take 28 mg by mouth Daily.       mirabegron (MYRBETRIQ) 50 mg Tb24 Take 50 mg by mouth once daily.      pantoprazole (PROTONIX) 40 MG tablet Take 40 mg by mouth every morning.      pilocarpine (SALAGEN) 5 MG Tab Take 10 mg by mouth every morning.      pilocarpine (SALAGEN) 5 MG Tab Take 5 mg by mouth every evening.      SITagliptin (JANUVIA) 100 MG Tab Take 100 mg by mouth every morning.      sodium chloride 0.9% 0.9 % SolP with fentaNYL 50 mcg/mL Soln 2 mcg/mL, bupivacaine 0.5% (5 mg/ml) 0.5 % (5 mg/mL) Soln 0.125 % by Epidural route continuous. Morphine added      ursodiol (ACTIGALL) 500 MG tablet Take 750 mg by mouth every evening.       MV-MIN/FA/D3/OM-3/DHA/EPA/FISH (CARDIAMIN ORAL) Take by mouth 2 (two) times daily.        Extensive surgical history    Anesthesia Evaluation    I have reviewed the Patient Summary Reports.   I have reviewed the NPO Status.   I have reviewed the Medications.     Review of Systems  Anesthesia Hx:  No problems with previous Anesthesia Denies Hx of Anesthetic complications  History of prior surgery of interest to airway management or planning: Denies Family Hx of Anesthesia complications.   Denies Personal Hx of Anesthesia complications.   Social:  No Alcohol Use, Former Smoker    Hematology/Oncology:  Hematology Normal   Oncology Normal     EENT/Dental:EENT/Dental Normal   Cardiovascular:   Exercise tolerance: good Hypertension CAD      Pulmonary:   Sleep Apnea    Renal/:   Chronic Renal Disease, CRI    Hepatic/GI:   GERD Hepatitis    Musculoskeletal:   Arthritis     Neurological:  Neurology Normal    Endocrine:   Diabetes, type 2    Psych:  Psychiatric Normal           Physical Exam  General:  Well nourished, Obesity    Airway/Jaw/Neck:  Airway Findings: Mouth Opening: Normal Tongue: Normal  General Airway Assessment: Adult, Average  Mallampati: III  TM Distance: Normal, at least 6 cm  Jaw/Neck Findings:  Neck ROM: Normal ROM      Dental:  Dental  Findings: Edentulous   Chest/Lungs:  Chest/Lungs Findings: Normal Respiratory Rate, Clear to auscultation     Heart/Vascular:  Heart Findings: Rate: Normal  Rhythm: Regular Rhythm        Mental Status:  Mental Status Findings:  Alert and Oriented, Cooperative         Anesthesia Plan  Type of Anesthesia, risks & benefits discussed:  Anesthesia Type:  general  Patient's Preference:   Intra-op Monitoring Plan: standard ASA monitors  Intra-op Monitoring Plan Comments:   Post Op Pain Control Plan: multimodal analgesia, IV/PO Opioids PRN and per primary service following discharge from PACU  Post Op Pain Control Plan Comments:   Induction:   IV  Beta Blocker:  Patient is not currently on a Beta-Blocker (No further documentation required).       Informed Consent: Patient understands risks and agrees with Anesthesia plan.  Questions answered. Anesthesia consent signed with patient.  ASA Score: 3     Day of Surgery Review of History & Physical:    H&P update referred to the provider.         Ready For Surgery From Anesthesia Perspective.

## 2020-11-09 NOTE — DISCHARGE INSTRUCTIONS
ERCP (Endoscopic Retrograde Cholangiopancreatography)     A balloon at the tip of a catheter opens above the stone. The stone is pulled out of the duct and leaves your body through stool.     ERCP stands for endoscopic retrograde cholangiopancreatography. This procedure is used to view the biliary and pancreatic ducts.  It is used to evaluate diseases that affect the ducts and to help locate and treat blockages that may be present.  How do I get ready for ERCP?  · Talk to your doctor about any health problems or allergies you have.  · Ask your doctor about the risks of ERCP. These include pancreatitis, infection, bleeding, and tearing the bowel.  · Be sure your doctor knows about all medicines you take. You may be told to stop taking some or all of them before the test. This includes:  · All prescription medicines  · Over-the-counter medicines that don't need a prescription  ·  Any street drugs you may use   · Herbs, vitamins, kelp, seaweed, cough syrups, and other supplements  · You may be asked to take antibiotics ahead of time.  · Avoid blood-thinning medicines for 1 week before ERCP.  · Do not eat or drink for 8 to 12 hours before ERCP.  · Have someone ready to take you home.  What happens during the procedure?  · You may be given medicine through an IV to help you relax.  · Your throat is numbed.  · A thin tube (endoscope) is placed into your throat. It is advanced from the throat through the upper digestive tract, to the common bile duct opening. The endoscope lets the doctor see the common bile and pancreatic ducts on a video screen.  · A cut may be made where the common bile duct opens to the duodenum to make it easier to remove stones.  · As blockages are located and removed, X-rays are taken.  · Contrast dye is injected through a catheter to make the duct show up better on the X-rays.  · An imaging technique that uses X-rays to obtain real-time moving images of internal organs is called fluoroscopy.  Fluoroscopy is used to watch and guide progress of the procedure.   · In some cases, a plastic tube (stent) is placed to hold the ducts open. This stent may be replaced or removed in 6 to 8 weeks. Or it may be left to fall out on its own and be passed in the stool.  What happens after ERCP?  Your doctor may discuss the test results right away or a return visit may be scheduled. You may go home the same day or spend the night in the hospital. Follow these tips:  · You can return to a normal routine the day after the ERCP.  · If a cut was made in the duct, avoid blood-thinning medicines such as aspirin for 5 to 7 days.  · Call your doctor right away if you have a fever or abdominal pain. These may be signs of an infection or torn bowel.   Date Last Reviewed: 6/19/2015  © 2646-2024 [employer]. [address] [place] [address], [place] [address]. All rights reserved. This information is not intended as a substitute for professional medical care. Always follow your healthcare professional's instructions.

## 2020-11-09 NOTE — [employer] PATIENT INSTRUCTIONS
Discharge Summary/Instructions after an Endoscopic Procedure  Patient Name: Justa Acosta  Patient MRN: [id number]  Patient YOB: 1940 Monday, November 09, 2020  Tray Hsu MD  RESTRICTIONS:  During your procedure today, you received medications for sedation.  These   medications may affect your judgment, balance and coordination.  Therefore,   for 24 hours, you have the following restrictions:   - DO NOT drive a car, operate machinery, make legal/financial decisions,   sign important papers or drink alcohol.    ACTIVITY:  Today: no heavy lifting, straining or running due to procedural   sedation/anesthesia.  The following day: return to full activity including work.  DIET:  Eat and drink normally unless instructed otherwise.     TREATMENT FOR COMMON SIDE EFFECTS:  - Mild abdominal pain, nausea, belching, bloating or excessive gas:  rest,   eat lightly and use a heating pad.  - Sore Throat: treat with throat lozenges and/or gargle with warm salt   water.  - Because air was used during the procedure, expelling large amounts of air   from your rectum or belching is normal.  - If a bowel prep was taken, you may not have a bowel movement for 1-3 days.    This is normal.  SYMPTOMS TO WATCH FOR AND REPORT TO YOUR PHYSICIAN:  1. Abdominal pain or bloating, other than gas cramps.  2. Chest pain.  3. Back pain.  4. Signs of infection such as: chills or fever occurring within 24 hours   after the procedure.  5. Rectal bleeding, which would show as bright red, maroon, or black stools.   (A tablespoon of blood from the rectum is not serious, especially if   hemorrhoids are present.)  6. Vomiting.  7. Weakness or dizziness.  GO DIRECTLY TO THE NEAREST EMERGENCY ROOM IF YOU HAVE ANY OF THE FOLLOWING:      Difficulty breathing              Chills and/or fever over 101 F   Persistent vomiting and/or vomiting blood   Severe abdominal pain   Severe chest pain   Black, tarry stools   Bleeding- more than one  tablespoon   Any other symptom or condition that you feel may need urgent attention  Your doctor recommends these additional instructions:  If any biopsies were taken, your doctors clinic will contact you in 1 to 2   weeks with any results.  - Discharge patient to home (ambulatory).   - Observe patient's clinical course.   - Watch for pancreatitis, bleeding, perforation, and cholangitis.   - Cipro (ciprofloxacin) 500 mg PO BID for 5 days.  For questions, problems or results please call your physician - Tray Hsu MD at Work:  [phone].  [facility], EMERGENCY ROOM PHONE NUMBER: [phone]  IF A COMPLICATION OR EMERGENCY SITUATION ARISES AND YOU ARE UNABLE TO REACH   YOUR PHYSICIAN - GO DIRECTLY TO THE EMERGENCY ROOM.  Tray Hsu MD  11/9/2020 9:59:23 AM  This report has been verified and signed electronically.  [employer]
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
## 2020-11-09 NOTE — PLAN OF CARE
Patient states they are ready to be discharged. Instructions and paper prescription given to patient and family. Both verbalize understanding. Patient tolerating po liquids with no difficulty. Patient states pain is at a tolerable level for them. Anesthesia consent and surgical consent in chart upon patient's discharge from [facility]. Patient placed back on 3L home oxygen before discharge.

## 2020-11-09 NOTE — BRIEF OP NOTE
Discharge Summary/Instructions after an Endoscopic Procedure    Patient Name: Justa Acosta  Patient MRN: [id number]  Patient YOB: 1940 Monday, November 09, 2020  Tray Hsu MD    RESTRICTIONS:  During your procedure today, you received medications for sedation.  These medications may affect your judgment, balance and coordination.  Therefore, for 24 hours, you have the following restrictions:     - DO NOT drive a car, operate machinery, make legal/financial decisions, sign important papers or drink alcohol.      ACTIVITY:  Today: no heavy lifting, straining or running due to procedural sedation/anesthesia.  The following day: return to full activity including work.    DIET:  Eat and drink normally unless instructed otherwise.     TREATMENT FOR COMMON SIDE EFFECTS:  - Mild abdominal pain, nausea, belching, bloating or excessive gas:  rest, eat lightly and use a heating pad.  - Sore Throat: treat with throat lozenges and/or gargle with warm salt water.  - Because air was used during the procedure, expelling large amounts of air from your rectum or belching is normal.  - If a bowel prep was taken, you may not have a bowel movement for 1-3 days.  This is normal.      SYMPTOMS TO WATCH FOR AND REPORT TO YOUR PHYSICIAN:  1. Abdominal pain or bloating, other than gas cramps.  2. Chest pain.  3. Back pain.  4. Signs of infection such as: chills or fever occurring within 24 hours after the procedure.  5. Rectal bleeding, which would show as bright red, maroon, or black stools. (A tablespoon of blood from the rectum is not serious, especially if hemorrhoids are present.)  6. Vomiting.  7. Weakness or dizziness.      GO DIRECTLY TO THE NEAREST EMERGENCY ROOM IF YOU HAVE ANY OF THE FOLLOWING:     Difficulty breathing              Chills and/or fever over 101 F   Persistent vomiting and/or vomiting blood   Severe abdominal pain   Severe chest pain   Black, tarry stools   Bleeding- more than one  tablespoon   Any other symptom or condition that you feel may need urgent attention    Your doctor recommends these additional instructions:  If any biopsies were taken, your doctors clinic will contact you in 1 to 2 weeks with any results.    - Discharge patient to home (ambulatory).   - Observe patient's clinical course.   - Watch for pancreatitis, bleeding, perforation, and cholangitis.   - Cipro (ciprofloxacin) 500 mg PO BID for 5 days.    For questions, problems or results please call your physician - Tray Hsu MD at Work:  [phone].    [facility], EMERGENCY ROOM PHONE NUMBER: [phone]    IF A COMPLICATION OR EMERGENCY SITUATION ARISES AND YOU ARE UNABLE TO REACH YOUR PHYSICIAN - GO DIRECTLY TO THE EMERGENCY ROOM.

## 2020-11-10 NOTE — ANESTHESIA POSTPROCEDURE EVALUATION
Anesthesia Post Evaluation    Patient: Justa Acosta    Procedure(s) Performed: Procedure(s) (LRB):  ERCP (ENDOSCOPIC RETROGRADE CHOLANGIOPANCREATOGRAPHY) (N/A)    Final Anesthesia Type: general    Patient location during evaluation: PACU  Patient participation: Yes- Able to Participate  Level of consciousness: awake and alert and oriented  Post-procedure vital signs: reviewed and stable  Pain management: adequate  Airway patency: patent    PONV status at discharge: No PONV  Anesthetic complications: no      Cardiovascular status: blood pressure returned to baseline and hemodynamically stable  Respiratory status: unassisted, spontaneous ventilation and room air  Hydration status: euvolemic  Follow-up not needed.          Vitals Value Taken Time   /67 11/09/20 1032   Temp 36.4 °C (97.5 °F) 11/09/20 1040   Pulse 79 11/09/20 1040   Resp 14 11/09/20 1040   SpO2 95 % 11/09/20 1040         No case tracking events are documented in the log.      Pain/Bridgette Score: Bridgette Score: 10 (11/9/2020 10:20 AM)

## 2022-08-05 NOTE — ASSESSMENT & PLAN NOTE
Justa Acosta is an 80 year old female with history of coronary artery disease, type 2 diabetes mellitus, dementia, PAD s/p aorto bi-fem stent, GERD, HTN, MVP, sjogren disease, cholecystectomy who presents with fever and abdominal pain at OSH.   Patient was found to have elevated liver enzymes (AST 1099, , ), total bilirubin of 0.7, and lactate 2.2. Right upper quadrant ultrasound with consistent with cholangitis showing intra and extrahepatic biliary dilatation, and dilatation of the common bile duct without evidence of gall stones. Transferred to [facility] to ERCP. Patient with history of cholangitis s/p sphincterotomy and stent placement (12/2018). Patient on ursodiol.    S/p ERCP on 8/18 with sphincterotomy and stent placement    Plan:  - Follow up with [facility] for ERCP in 3-4 weeks  - PO ciprofloxacin and flagyl for 7 days upon discharge  
Justa Acosta is an 80 year old female with history of coronary artery disease, type 2 diabetes mellitus, dementia, PAD s/p aorto bi-fem stent, GERD, HTN, MVP, sjogren disease, cholecystectomy who presents with fever and abdominal pain at OSH.   Patient was found to have elevated liver enzymes (AST 1099, , ), total bilirubin of 0.7, and lactate 2.2. Right upper quadrant ultrasound with consistent with cholangitis showing intra and extrahepatic biliary dilatation, and dilatation of the common bile duct without evidence of gall stones. Transferred to [facility] to ERCP. Patient with history of cholangitis s/p sphincterotomy and stent placement (12/2018). Patient on ursodiol.  Plan:  - Patient NPO  - Consulted [facility], plan for ERCP 8/18 and awaiting further reccs  - Continue zosyn      
Ms Acosta is an 80 year old female with history of CAD, PAD s/p aorto bi-fem stent, COPD, sjogren's, DM, GERD, HTN, MVP, CCY, who is presenting from OSH due to concern for cholangitis.    Presenting to hospital with 1 day of abdominal pain, fever, abnormal LFT and imaging concerning for debris in distal CBD concerning for cholangitis.    Plan  - NPO  - ERCP today  - continue broad spectrum antibiotics  - further recommendations to follow    
On irbesartan and amlodipine at home  Plan:  - Hold antihypertensive in the context of cholangitis    
On irbesartan and amlodipine at home  Plan:  - Restart antihypertensives  
Patient with aorto bi-fem stents  Plan:  - Continue aspirin  
Patient with aorto bi-fem stents  Plan:  - Hold aspirin in context of ERCP  
Patient with known dementia at baseline. Per daughter, patient requires fluoxetine, amitriptyline, and xanax otherwise she may have delirium/mental status changes overnight.  Plan:  - Continue home memantine and donepezil  - Continue home fluoxetine, amitriptyline, and xanax    
Patient with known dementia at baseline. Per daughter, patient requires fluoxetine, amitriptyline, and xanax otherwise she may have delirium/mental status changes overnight.  Plan:  - Continue home memantine and donepezil  - Continue home fluoxetine, amitriptyline, and xanax    
Plan:  - Continue home atorvastatin    
Plan:  - Continue home atorvastatin    
Previous HbA1c 6.9% (4/2020) on Januvia at home  Plan:  - Repeat HbA1c   - Aspart 0-5 units PRN    
Previous HbA1c 6.9% (4/2020) on Januvia at home  Plan:  - Restart januvia at home    
See cholangitis      
See dementia without behavioral disturbance    
See dementia without behavioral disturbance    
See transaminitis    
See transaminitis    
Wheelchair
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
